# Patient Record
Sex: FEMALE | Race: WHITE | Employment: FULL TIME | URBAN - METROPOLITAN AREA
[De-identification: names, ages, dates, MRNs, and addresses within clinical notes are randomized per-mention and may not be internally consistent; named-entity substitution may affect disease eponyms.]

---

## 2019-12-16 ENCOUNTER — HOSPITAL ENCOUNTER (EMERGENCY)
Age: 24
Discharge: HOME | End: 2019-12-16
Attending: EMERGENCY MEDICINE
Payer: COMMERCIAL

## 2019-12-16 VITALS
SYSTOLIC BLOOD PRESSURE: 126 MMHG | HEART RATE: 94 BPM | BODY MASS INDEX: 19.83 KG/M2 | WEIGHT: 105 LBS | RESPIRATION RATE: 16 BRPM | HEIGHT: 61 IN | OXYGEN SATURATION: 100 % | DIASTOLIC BLOOD PRESSURE: 80 MMHG | TEMPERATURE: 98 F

## 2019-12-16 DIAGNOSIS — R10.13 ABDOMINAL PAIN, EPIGASTRIC: Primary | ICD-10-CM

## 2019-12-16 LAB
A/G RATIO: 1.7 (ref 1.1–2.2)
ALBUMIN SERPL-MCNC: 4.9 G/DL (ref 3.4–5)
ALP BLD-CCNC: 55 U/L (ref 40–129)
ALT SERPL-CCNC: 12 U/L (ref 10–40)
ANION GAP SERPL CALCULATED.3IONS-SCNC: 14 MMOL/L (ref 3–16)
AST SERPL-CCNC: 16 U/L (ref 15–37)
BASOPHILS ABSOLUTE: 0 K/UL (ref 0–0.2)
BASOPHILS RELATIVE PERCENT: 0.4 %
BILIRUB SERPL-MCNC: 0.3 MG/DL (ref 0–1)
BUN BLDV-MCNC: 11 MG/DL (ref 7–20)
CALCIUM SERPL-MCNC: 9.9 MG/DL (ref 8.3–10.6)
CHLORIDE BLD-SCNC: 104 MMOL/L (ref 99–110)
CO2: 21 MMOL/L (ref 21–32)
CREAT SERPL-MCNC: 0.7 MG/DL (ref 0.6–1.1)
EKG ATRIAL RATE: 104 BPM
EKG DIAGNOSIS: NORMAL
EKG P AXIS: 75 DEGREES
EKG P-R INTERVAL: 120 MS
EKG Q-T INTERVAL: 338 MS
EKG QRS DURATION: 80 MS
EKG QTC CALCULATION (BAZETT): 444 MS
EKG R AXIS: 50 DEGREES
EKG T AXIS: 1 DEGREES
EKG VENTRICULAR RATE: 104 BPM
EOSINOPHILS ABSOLUTE: 0 K/UL (ref 0–0.6)
EOSINOPHILS RELATIVE PERCENT: 0 %
GFR AFRICAN AMERICAN: >60
GFR NON-AFRICAN AMERICAN: >60
GLOBULIN: 2.9 G/DL
GLUCOSE BLD-MCNC: 97 MG/DL (ref 70–99)
HCG QUALITATIVE: NEGATIVE
HCT VFR BLD CALC: 44.3 % (ref 36–48)
HEMOGLOBIN: 14.7 G/DL (ref 12–16)
LYMPHOCYTES ABSOLUTE: 1 K/UL (ref 1–5.1)
LYMPHOCYTES RELATIVE PERCENT: 12.9 %
MCH RBC QN AUTO: 29.5 PG (ref 26–34)
MCHC RBC AUTO-ENTMCNC: 33.2 G/DL (ref 31–36)
MCV RBC AUTO: 88.9 FL (ref 80–100)
MONOCYTES ABSOLUTE: 0.3 K/UL (ref 0–1.3)
MONOCYTES RELATIVE PERCENT: 3.5 %
NEUTROPHILS ABSOLUTE: 6.6 K/UL (ref 1.7–7.7)
NEUTROPHILS RELATIVE PERCENT: 83.2 %
PDW BLD-RTO: 12.6 % (ref 12.4–15.4)
PLATELET # BLD: 279 K/UL (ref 135–450)
PMV BLD AUTO: 7.6 FL (ref 5–10.5)
POTASSIUM REFLEX MAGNESIUM: 3.7 MMOL/L (ref 3.5–5.1)
RBC # BLD: 4.98 M/UL (ref 4–5.2)
SODIUM BLD-SCNC: 139 MMOL/L (ref 136–145)
TOTAL PROTEIN: 7.8 G/DL (ref 6.4–8.2)
WBC # BLD: 8 K/UL (ref 4–11)

## 2019-12-16 PROCEDURE — 6370000000 HC RX 637 (ALT 250 FOR IP): Performed by: EMERGENCY MEDICINE

## 2019-12-16 PROCEDURE — 84703 CHORIONIC GONADOTROPIN ASSAY: CPT

## 2019-12-16 PROCEDURE — 99284 EMERGENCY DEPT VISIT MOD MDM: CPT

## 2019-12-16 PROCEDURE — 80053 COMPREHEN METABOLIC PANEL: CPT

## 2019-12-16 PROCEDURE — 93010 ELECTROCARDIOGRAM REPORT: CPT | Performed by: INTERNAL MEDICINE

## 2019-12-16 PROCEDURE — 85025 COMPLETE CBC W/AUTO DIFF WBC: CPT

## 2019-12-16 PROCEDURE — 93005 ELECTROCARDIOGRAM TRACING: CPT | Performed by: EMERGENCY MEDICINE

## 2019-12-16 RX ORDER — PANTOPRAZOLE SODIUM 40 MG/1
40 TABLET, DELAYED RELEASE ORAL ONCE
Status: COMPLETED | OUTPATIENT
Start: 2019-12-16 | End: 2019-12-16

## 2019-12-16 RX ORDER — OMEPRAZOLE 20 MG/1
20 CAPSULE, DELAYED RELEASE ORAL
Qty: 90 CAPSULE | Refills: 0 | Status: SHIPPED | OUTPATIENT
Start: 2019-12-16

## 2019-12-16 RX ORDER — ONDANSETRON 4 MG/1
4 TABLET, ORALLY DISINTEGRATING ORAL ONCE
Status: COMPLETED | OUTPATIENT
Start: 2019-12-16 | End: 2019-12-16

## 2019-12-16 RX ADMIN — PANTOPRAZOLE SODIUM 40 MG: 40 TABLET, DELAYED RELEASE ORAL at 10:28

## 2019-12-16 RX ADMIN — ONDANSETRON 4 MG: 4 TABLET, ORALLY DISINTEGRATING ORAL at 09:38

## 2019-12-16 RX ADMIN — LIDOCAINE HYDROCHLORIDE: 20 SOLUTION ORAL; TOPICAL at 09:37

## 2019-12-16 SDOH — HEALTH STABILITY: MENTAL HEALTH: HOW OFTEN DO YOU HAVE A DRINK CONTAINING ALCOHOL?: NEVER

## 2019-12-16 ASSESSMENT — PAIN DESCRIPTION - ONSET: ONSET: AWAKENED FROM SLEEP

## 2019-12-16 ASSESSMENT — PAIN DESCRIPTION - DESCRIPTORS: DESCRIPTORS: ACHING

## 2019-12-16 ASSESSMENT — PAIN DESCRIPTION - PAIN TYPE: TYPE: ACUTE PAIN

## 2019-12-16 ASSESSMENT — PAIN DESCRIPTION - FREQUENCY: FREQUENCY: CONTINUOUS

## 2019-12-16 ASSESSMENT — PAIN SCALES - GENERAL: PAINLEVEL_OUTOF10: 8

## 2019-12-16 ASSESSMENT — PAIN DESCRIPTION - LOCATION: LOCATION: ABDOMEN

## 2019-12-20 ENCOUNTER — HOSPITAL ENCOUNTER (OUTPATIENT)
Dept: ULTRASOUND IMAGING | Age: 24
Discharge: HOME | End: 2019-12-20
Payer: COMMERCIAL

## 2019-12-20 DIAGNOSIS — R10.84 ABDOMINAL PAIN, GENERALIZED: ICD-10-CM

## 2019-12-20 PROCEDURE — 76700 US EXAM ABDOM COMPLETE: CPT

## 2021-06-11 ENCOUNTER — APPOINTMENT (OUTPATIENT)
Dept: ULTRASOUND IMAGING | Age: 26
End: 2021-06-11
Payer: COMMERCIAL

## 2021-06-11 ENCOUNTER — HOSPITAL ENCOUNTER (EMERGENCY)
Age: 26
Discharge: HOME | End: 2021-06-11
Payer: COMMERCIAL

## 2021-06-11 VITALS
OXYGEN SATURATION: 98 % | SYSTOLIC BLOOD PRESSURE: 132 MMHG | HEART RATE: 72 BPM | BODY MASS INDEX: 19.83 KG/M2 | HEIGHT: 61 IN | DIASTOLIC BLOOD PRESSURE: 88 MMHG | RESPIRATION RATE: 15 BRPM | TEMPERATURE: 98.7 F | WEIGHT: 105 LBS

## 2021-06-11 DIAGNOSIS — O20.0 THREATENED MISCARRIAGE: ICD-10-CM

## 2021-06-11 DIAGNOSIS — O46.90 VAGINAL BLEEDING IN PREGNANCY: Primary | ICD-10-CM

## 2021-06-11 LAB
A/G RATIO: 1.8 (ref 1.1–2.2)
ABO/RH: NORMAL
ALBUMIN SERPL-MCNC: 5 G/DL (ref 3.4–5)
ALP BLD-CCNC: 53 U/L (ref 40–129)
ALT SERPL-CCNC: 9 U/L (ref 10–40)
ANION GAP SERPL CALCULATED.3IONS-SCNC: 12 MMOL/L (ref 3–16)
AST SERPL-CCNC: 14 U/L (ref 15–37)
BACTERIA: ABNORMAL /HPF
BASOPHILS ABSOLUTE: 0 K/UL (ref 0–0.2)
BASOPHILS RELATIVE PERCENT: 0.4 %
BILIRUB SERPL-MCNC: 0.4 MG/DL (ref 0–1)
BILIRUBIN URINE: NEGATIVE
BLOOD, URINE: ABNORMAL
BUN BLDV-MCNC: 10 MG/DL (ref 7–20)
CALCIUM SERPL-MCNC: 9.2 MG/DL (ref 8.3–10.6)
CHLORIDE BLD-SCNC: 102 MMOL/L (ref 99–110)
CLARITY: ABNORMAL
CO2: 24 MMOL/L (ref 21–32)
COLOR: ABNORMAL
CREAT SERPL-MCNC: 0.6 MG/DL (ref 0.6–1.1)
EOSINOPHILS ABSOLUTE: 0 K/UL (ref 0–0.6)
EOSINOPHILS RELATIVE PERCENT: 0.5 %
EPITHELIAL CELLS, UA: ABNORMAL /HPF (ref 0–5)
GFR AFRICAN AMERICAN: >60
GFR NON-AFRICAN AMERICAN: >60
GLOBULIN: 2.8 G/DL
GLUCOSE BLD-MCNC: 100 MG/DL (ref 70–99)
GLUCOSE URINE: NEGATIVE MG/DL
GONADOTROPIN, CHORIONIC (HCG) QUANT: 3361 MIU/ML
HCT VFR BLD CALC: 43.1 % (ref 36–48)
HEMOGLOBIN: 14.4 G/DL (ref 12–16)
KETONES, URINE: NEGATIVE MG/DL
LEUKOCYTE ESTERASE, URINE: NEGATIVE
LYMPHOCYTES ABSOLUTE: 1.4 K/UL (ref 1–5.1)
LYMPHOCYTES RELATIVE PERCENT: 23 %
MAGNESIUM: 2.3 MG/DL (ref 1.8–2.4)
MCH RBC QN AUTO: 30.6 PG (ref 26–34)
MCHC RBC AUTO-ENTMCNC: 33.5 G/DL (ref 31–36)
MCV RBC AUTO: 91.6 FL (ref 80–100)
MICROSCOPIC EXAMINATION: YES
MONOCYTES ABSOLUTE: 0.3 K/UL (ref 0–1.3)
MONOCYTES RELATIVE PERCENT: 5.3 %
NEUTROPHILS ABSOLUTE: 4.4 K/UL (ref 1.7–7.7)
NEUTROPHILS RELATIVE PERCENT: 70.8 %
NITRITE, URINE: NEGATIVE
PDW BLD-RTO: 12.8 % (ref 12.4–15.4)
PH UA: 7.5 (ref 5–8)
PLATELET # BLD: 248 K/UL (ref 135–450)
PMV BLD AUTO: 7.9 FL (ref 5–10.5)
POTASSIUM REFLEX MAGNESIUM: 3.4 MMOL/L (ref 3.5–5.1)
PROTEIN UA: ABNORMAL MG/DL
RBC # BLD: 4.71 M/UL (ref 4–5.2)
RBC UA: >100 /HPF (ref 0–4)
SODIUM BLD-SCNC: 138 MMOL/L (ref 136–145)
SPECIFIC GRAVITY UA: 1.01 (ref 1–1.03)
SPECIMEN STATUS: NORMAL
TOTAL PROTEIN: 7.8 G/DL (ref 6.4–8.2)
URINE TYPE: ABNORMAL
UROBILINOGEN, URINE: 0.2 E.U./DL
WBC # BLD: 6.2 K/UL (ref 4–11)
WBC UA: ABNORMAL /HPF (ref 0–5)

## 2021-06-11 PROCEDURE — 86900 BLOOD TYPING SEROLOGIC ABO: CPT

## 2021-06-11 PROCEDURE — 86901 BLOOD TYPING SEROLOGIC RH(D): CPT

## 2021-06-11 PROCEDURE — 80053 COMPREHEN METABOLIC PANEL: CPT

## 2021-06-11 PROCEDURE — 81001 URINALYSIS AUTO W/SCOPE: CPT

## 2021-06-11 PROCEDURE — 83735 ASSAY OF MAGNESIUM: CPT

## 2021-06-11 PROCEDURE — 85025 COMPLETE CBC W/AUTO DIFF WBC: CPT

## 2021-06-11 PROCEDURE — 84702 CHORIONIC GONADOTROPIN TEST: CPT

## 2021-06-11 PROCEDURE — 76817 TRANSVAGINAL US OBSTETRIC: CPT

## 2021-06-11 PROCEDURE — 99283 EMERGENCY DEPT VISIT LOW MDM: CPT

## 2021-06-11 ASSESSMENT — PAIN DESCRIPTION - ORIENTATION: ORIENTATION: LOWER

## 2021-06-11 ASSESSMENT — ENCOUNTER SYMPTOMS
NAUSEA: 0
VOMITING: 0
DIARRHEA: 0
BACK PAIN: 0
ABDOMINAL PAIN: 1
WHEEZING: 0
COUGH: 0
SHORTNESS OF BREATH: 0
COLOR CHANGE: 0

## 2021-06-11 ASSESSMENT — PAIN DESCRIPTION - DESCRIPTORS: DESCRIPTORS: CRAMPING

## 2021-06-11 ASSESSMENT — PAIN DESCRIPTION - PAIN TYPE: TYPE: ACUTE PAIN

## 2021-06-11 ASSESSMENT — PAIN DESCRIPTION - FREQUENCY: FREQUENCY: CONTINUOUS

## 2021-06-11 ASSESSMENT — PAIN DESCRIPTION - LOCATION: LOCATION: ABDOMEN

## 2021-06-11 ASSESSMENT — PAIN SCALES - GENERAL: PAINLEVEL_OUTOF10: 3

## 2021-06-11 NOTE — ED NOTES
Saline lock removed intact. IV site looked unremarkable. Pressure dressing applied. Discharge instructions reviewed with Ms. Randy Larson. She verbalized understanding. Copy of discharge instructions given. Ms. Randy Larson was discharged to home in good condition per personal vehicle, family driving. She exited the ED without difficulty.         Rocio Arboleda RN  06/11/21 5218

## 2021-06-11 NOTE — LETTER
[facility]  [address] [address]  Phone: [phone]  Fax: [phone]               June 11, 2021    Patient: Xochilt Hameed   YOB: 1995   Date of Visit: 6/11/2021       To Whom It May Concern:    Makeda Sharpe was seen and treated in our emergency department on 6/11/2021. She may return to work on 6/16/2021.       Sincerely,           Signature:__________________________________

## 2021-06-11 NOTE — ED NOTES
200 - called [facility] per consult  Re: OB d/t miscarriage  [phone] - [facility] called stating that we can call Dr Sea Walsh directly as he is on-call for today  [id number] - BRENDA Alba called Dr Sea Walsh at this time to speak regarding pt     Calos Anselmo  06/11/21 1668

## 2021-06-11 NOTE — ED PROVIDER NOTES
**ADVANCED PRACTICE PROVIDER, I HAVE EVALUATED THIS [facility]  ED  EMERGENCY DEPARTMENT ENCOUNTER      Pt Name: April D Emily Ma  RTE:[id number]  [place] 1995  Date of evaluation: 6/11/2021  Provider: LOY Hayes CNP      Chief Complaint:    Chief Complaint   Patient presents with    Vaginal Bleeding     patient is 7 weeks and 5 days pregnant. Patient started having some bleeding and abdominal cramping last night at midnight, cramping is intermittant and bleeding since 0600. 1 pad per 1.5hrs. Patient c/o nausea         Nursing Notes, Past Medical Hx, Past Surgical Hx, Social Hx, Allergies, and Family Hx were all reviewed and agreed with or any disagreements were addressed in the HPI.    HPI: (Location, Duration, Timing, Severity, Quality, Assoc Sx, Context, Modifying factors)  This is a  23 y.o. female who presents with vaginal bleeding in pregnancy, she denies saturating a pad, however, she states that she has been spotting off and on for the past week and a half, however, last night started having heavier bright red bleeding but not passing blood clots. She states that she changes her pad every 1-2 hours, but there is not a lot of heavy bleeding. She sees [facility] and was told to come into the ED. he rates her lower abdominal cramping a 3 out of 10. Denies any vomiting or diarrhea but has felt nauseous. No urinary symptoms. Recently was seen at [facility] however has not had an ultrasound can confirm pregnancy. She is G-1, a-0, P-0. She states she is approximately 7-1/2 weeks pregnant. She denies any additional complaints, no additional aggravating relieving factors. Patient presents awake, alert and in no acute distress or toxic appearance.     PastMedical/Surgical History:      Diagnosis Date    Seasonal allergies          Procedure Laterality Date    WISDOM TOOTH EXTRACTION         Medications:  Previous Medications    ESTROPIPATE (OGEN) 1.5 MG TABLET    Take 1.5 mg by mouth daily    OMEPRAZOLE (PRILOSEC) 20 MG DELAYED RELEASE CAPSULE    Take 1 capsule by mouth every morning (before breakfast)         Review of Systems:  (2-9 systems needed)  Review of Systems   Constitutional: Negative for chills and fever. HENT: Negative for congestion. Respiratory: Negative for cough, shortness of breath and wheezing. Cardiovascular: Negative for chest pain. Gastrointestinal: Positive for abdominal pain. Negative for diarrhea, nausea and vomiting. She does report having suprapubic abdominal cramping associate with her pregnancy, has had some nausea but no vomiting or diarrhea no urinary symptoms   Genitourinary: Positive for pelvic pain and vaginal bleeding. Negative for difficulty urinating, dysuria, frequency, hematuria and urgency. Patient complains of suprapubic abdominal cramping and vaginal bleeding, states that she started with this yesterday evening, is not saturating pad, denies any excessive blood clots. States she is only changing her pad every 1-2 hours but they are not even saturated. She states she is G-1, P-0, a-0 with unsure of exact last menstrual cycle however she believes she is 7 weeks and 5 days pregnant, follows [facility]. Musculoskeletal: Negative for back pain. Skin: Negative for color change. Neurological: Negative for weakness, numbness and headaches. Physical Exam:  Physical Exam  Vitals and nursing note reviewed. Constitutional:       Appearance: She is well-developed. She is not diaphoretic. HENT:      Head: Normocephalic. Right Ear: External ear normal.      Left Ear: External ear normal.   Eyes:      General: No scleral icterus. Right eye: No discharge. Left eye: No discharge. Cardiovascular:      Rate and Rhythm: Normal rate. Pulmonary:      Effort: Pulmonary effort is normal. No respiratory distress. Breath sounds: Normal breath sounds.    Abdominal: Palpations: Abdomen is soft. Tenderness: There is abdominal tenderness. Comments: Abdomen is flat soft and nondistended. Bowel sounds are hypoactive, no significant tenderness guarding or rebound tenderness. No ascites or rigidity. She is having cramping in the suprapubic region. Musculoskeletal:         General: Normal range of motion. Cervical back: Normal range of motion and neck supple. Skin:     General: Skin is warm. Capillary Refill: Capillary refill takes less than 2 seconds. Coloration: Skin is not pale. Neurological:      General: No focal deficit present. Mental Status: She is alert and oriented to person, place, and time. GCS: GCS eye subscore is 4. GCS verbal subscore is 5. GCS motor subscore is 6.    Psychiatric:         Behavior: Behavior normal.         MEDICAL DECISION MAKING    Vitals:    Vitals:    06/11/21 1104 06/11/21 1304 06/11/21 1402 06/11/21 1517   BP: 126/82 130/75 116/70 132/88   Pulse: 81 70  72   Resp: 16 16  15   Temp: 98.7 °F (37.1 °C)      TempSrc: Oral      SpO2: 99% 100% 100% 98%   Weight: 105 lb (47.6 kg)      Height: 5' 1\" (1.549 m)          LABS:  Labs Reviewed   COMPREHENSIVE METABOLIC PANEL W/ REFLEX TO MG FOR LOW K - Abnormal; Notable for the following components:       Result Value    Potassium reflex Magnesium 3.4 (*)     Glucose 100 (*)     ALT 9 (*)     AST 14 (*)     All other components within normal limits    Narrative:     Performed at:  [place] [address] [address]   Phone [phone]   URINALYSIS - Abnormal; Notable for the following components:    Color, UA GISELE (*)     Clarity, UA SL CLOUDY (*)     Blood, Urine LARGE (*)     Protein, UA TRACE (*)     All other components within normal limits    Narrative:     Performed at:  [place] [address] [address]   Phone [phone]   MICROSCOPIC URINALYSIS - of the patient's condition and risk of further deterioration, critical care time required my full attention to the patient's condition; which included chart data review, documentation, medication ordering, reviewing the patient's old records, reevaluation patient's cardiac, pulmonary and neurological status. Reevaluation of vital signs. Consultations with ED attending and admitting physician. Ordering, interpreting reviewing diagnostic testing. Therefore a critical care time was 18 minutes of direct attention to the patient's condition did not include time spent on procedures. PROCEDURES:   Procedures    Pelvic exam: VULVA: normal appearing vulva with no masses, tenderness or lesions, VAGINA: normal appearing vagina with normal color and discharge, no lesions, CERVIX: Cervical os closed, normal appearing cervix without discharge or lesions, cervical motion tenderness absent, UTERUS: uterus is normal size, shape, consistency and nontender, exam chaperoned by staff ED Tech. Patient was given:  Medications - No data to display  Patient complains of suprapubic abdominal cramping and vaginal bleeding, states that she started with this yesterday evening, is not saturating pad, denies any excessive blood clots. States she is only changing her pad every 1-2 hours but they are not even saturated. She states she is G-1, P-0, a-0 with unsure of exact last menstrual cycle however she believes she is 7 weeks and 5 days pregnant, follows [facility]. After evaluation and examination the patient IV access, blood work, urinalysis, transvaginal ultrasound and pelvic exam was done by myself. CBC shows no sepsis or anemia. Metabolic panel shows no electrolyte disturbances or renal insufficiency. Liver functions are normal.  Beta count is 3361. She is Rh+. Urine shows blood most likely vaginal, I did do a pelvic exam, see procedure note, the os appears to be closed.   Transvaginal ultrasound shows no evidence of IUP, small amount of free fluid noted in the pelvis, ovaries grossly unremarkable. However, I do believe the patient is a threatened miscarriage or early IUP. I paged OB/GYN on-call. At 1540 I spoke with Dr. Ron Kinsey about on-call, we discussed the patient's case at length, he agrees with my plan that the patient should be discharged home with specific instructions to return immediately for emergent condition however more importantly that the patient have a repeat beta done on Sunday by coming to outpatient lab. Therefore, at this time no concerns for ectopic pregnancy that requires emergent surgical intervention, sepsis or acute surgical abdomen. It was agreed the patient will be discharged home with outpatient follow-up, shared medical decision was made between the patient, OB/GYN on-call and myself only agreed patient be discharged home with outpatient follow-up. Patient was educated to come back on Sunday to have a repeat beta count, keep her appointment is already scheduled on Tuesday with [facility] group. Return immediately to the ER for any worsening or concerning symptoms. Take any Tylenol for discomfort. The patient tolerated their visit well. I evaluated the patient. The physician was available for consultation as needed. The patient and / or the family were informed of the results of any tests, a time was given to answer questions, a plan was proposed and they agreed with plan. Patient verbalized understanding of discharge instructions and was discharged from the department in stable condition. CLINICAL IMPRESSION:  1. Vaginal bleeding in pregnancy    2.  Threatened miscarriage        DISPOSITION Decision To Discharge 06/11/2021 03:52:13 PM      PATIENT REFERRED TO:  Shelia Roa MD  [address] [address]  [phone]    Schedule an appointment as soon as possible for a visit   As needed    Ross Daniels MD  [phone] [id number] [address] [facility] [address]  [facility]

## 2021-06-13 ENCOUNTER — HOSPITAL ENCOUNTER (OUTPATIENT)
Age: 26
Setting detail: SPECIMEN
Discharge: HOME | End: 2021-06-13
Payer: COMMERCIAL

## 2021-06-13 DIAGNOSIS — O20.0 THREATENED MISCARRIAGE: ICD-10-CM

## 2021-06-13 DIAGNOSIS — O46.90 VAGINAL BLEEDING IN PREGNANCY: ICD-10-CM

## 2021-06-13 PROCEDURE — 84702 CHORIONIC GONADOTROPIN TEST: CPT

## 2021-06-13 PROCEDURE — 36415 COLL VENOUS BLD VENIPUNCTURE: CPT

## 2021-06-14 LAB — GONADOTROPIN, CHORIONIC (HCG) QUANT: 4317 MIU/ML

## 2021-06-15 ENCOUNTER — HOSPITAL ENCOUNTER (OUTPATIENT)
Dept: NURSING | Age: 26
Setting detail: INFUSION SERIES
Discharge: HOME | End: 2021-06-15

## 2021-06-15 VITALS
WEIGHT: 106 LBS | HEART RATE: 83 BPM | HEIGHT: 61 IN | BODY MASS INDEX: 20.01 KG/M2 | TEMPERATURE: 97.3 F | RESPIRATION RATE: 16 BRPM | SYSTOLIC BLOOD PRESSURE: 119 MMHG | DIASTOLIC BLOOD PRESSURE: 80 MMHG

## 2021-06-15 LAB
A/G RATIO: 1.8 (ref 1.1–2.2)
ABO/RH: NORMAL
ALBUMIN SERPL-MCNC: 5.1 G/DL (ref 3.4–5)
ALP BLD-CCNC: 57 U/L (ref 40–129)
ALT SERPL-CCNC: 15 U/L (ref 10–40)
ANION GAP SERPL CALCULATED.3IONS-SCNC: 9 MMOL/L (ref 3–16)
ANTIBODY SCREEN: NORMAL
AST SERPL-CCNC: 18 U/L (ref 15–37)
BILIRUB SERPL-MCNC: 0.3 MG/DL (ref 0–1)
BUN BLDV-MCNC: 13 MG/DL (ref 7–20)
CALCIUM SERPL-MCNC: 9.3 MG/DL (ref 8.3–10.6)
CHLORIDE BLD-SCNC: 104 MMOL/L (ref 99–110)
CO2: 25 MMOL/L (ref 21–32)
CREAT SERPL-MCNC: 0.7 MG/DL (ref 0.6–1.1)
GFR AFRICAN AMERICAN: >60
GFR NON-AFRICAN AMERICAN: >60
GLOBULIN: 2.8 G/DL
GLUCOSE BLD-MCNC: 94 MG/DL (ref 70–99)
GONADOTROPIN, CHORIONIC (HCG) QUANT: 6153 MIU/ML
HCT VFR BLD CALC: 41.6 % (ref 36–48)
HEMOGLOBIN: 14.1 G/DL (ref 12–16)
MCH RBC QN AUTO: 30.8 PG (ref 26–34)
MCHC RBC AUTO-ENTMCNC: 33.9 G/DL (ref 31–36)
MCV RBC AUTO: 90.8 FL (ref 80–100)
PDW BLD-RTO: 12.8 % (ref 12.4–15.4)
PLATELET # BLD: 215 K/UL (ref 135–450)
PMV BLD AUTO: 7.9 FL (ref 5–10.5)
POTASSIUM SERPL-SCNC: 4.2 MMOL/L (ref 3.5–5.1)
RBC # BLD: 4.58 M/UL (ref 4–5.2)
SODIUM BLD-SCNC: 138 MMOL/L (ref 136–145)
TOTAL PROTEIN: 7.9 G/DL (ref 6.4–8.2)
WBC # BLD: 5.7 K/UL (ref 4–11)

## 2021-06-15 PROCEDURE — 80053 COMPREHEN METABOLIC PANEL: CPT

## 2021-06-15 PROCEDURE — 96401 CHEMO ANTI-NEOPL SQ/IM: CPT

## 2021-06-15 PROCEDURE — 99211 OFF/OP EST MAY X REQ PHY/QHP: CPT

## 2021-06-15 PROCEDURE — 86900 BLOOD TYPING SEROLOGIC ABO: CPT

## 2021-06-15 PROCEDURE — 6360000002 HC RX W HCPCS: Performed by: OBSTETRICS & GYNECOLOGY

## 2021-06-15 PROCEDURE — 86850 RBC ANTIBODY SCREEN: CPT

## 2021-06-15 PROCEDURE — 86901 BLOOD TYPING SEROLOGIC RH(D): CPT

## 2021-06-15 PROCEDURE — 85027 COMPLETE CBC AUTOMATED: CPT

## 2021-06-15 PROCEDURE — 84702 CHORIONIC GONADOTROPIN TEST: CPT

## 2021-06-15 RX ORDER — METHOTREXATE 25 MG/ML
50 INJECTION, SOLUTION INTRA-ARTERIAL; INTRAMUSCULAR; INTRAVENOUS ONCE
Status: COMPLETED | OUTPATIENT
Start: 2021-06-15 | End: 2021-06-15

## 2021-06-15 RX ADMIN — METHOTREXATE 71.5 MG: 25 SOLUTION INTRA-ARTERIAL; INTRAMUSCULAR; INTRATHECAL; INTRAVENOUS at 13:07

## 2021-06-15 ASSESSMENT — PAIN DESCRIPTION - DESCRIPTORS: DESCRIPTORS: CRAMPING

## 2021-06-15 ASSESSMENT — PAIN - FUNCTIONAL ASSESSMENT: PAIN_FUNCTIONAL_ASSESSMENT: 0-10

## 2021-06-15 NOTE — H&P
Department of Obstetrics and Gynecology  Attending Obstetrics History and Physical        CHIEF COMPLAINT:  For Methotrexate administration 2/2 high suspicion for ectopic pregnacy    HISTORY OF PRESENT ILLNESS:      25yo G2 P 0-0-1-0 at 8w 4d by LMP [facility] 21 here for MTX administration after pregnancy of unknown etiology and followed with serial bhcg levels after initial amenorrhea visit in office at [facility] and subsequent ED visit on 21 c/o vaginal bleeding and pelvic cramping, at that time, US result showed no IUP, unremarkable ovaries, small free fluid in CDS, no evidence/suspicion for ectopic pregnancy. Clinical sx have since resolved. However she presented to the office today for repeat pelvic US and found to have no IUP, left adnexal mass measuring 1.8cm, with doppler flow, no cardiac activity, no free fluid. Serial bhc21:  2447  21:  3361  21:  4317  6/15/21:  6153    REVIEW OF SYSTEMS    See HPI for further details. Review of systems otherwise negative. PAST MEDICAL HISTORY    Past Medical History:   Diagnosis Date    Seasonal allergies        FAMILY HISTORY    History reviewed. No pertinent family history.     SOCIAL HISTORY    Social History     Socioeconomic History    Marital status: Single     Spouse name: None    Number of children: None    Years of education: None    Highest education level: None   Occupational History    None   Tobacco Use    Smoking status: Never Smoker    Smokeless tobacco: Never Used   Substance and Sexual Activity    Alcohol use: Not Currently     Comment: occ    Drug use: No    Sexual activity: None   Other Topics Concern    None   Social History Narrative    None     Social Determinants of Health     Financial Resource Strain:     Difficulty of Paying Living Expenses:    Food Insecurity:     Worried About Running Out of Food in the Last Year:     Ran Out of Food in the Last Year:    Transportation Needs:     Lack of Transportation (Medical):  Lack of Transportation (Non-Medical):    Physical Activity:     Days of Exercise per Week:     Minutes of Exercise per Session:    Stress:     Feeling of Stress :    Social Connections:     Frequency of Communication with Friends and Family:     Frequency of Social Gatherings with Friends and Family:     Attends Rastafarian Services:     Active Member of Clubs or Organizations:     Attends Club or Organization Meetings:     Marital Status:    Intimate Partner Violence:     Fear of Current or Ex-Partner:     Emotionally Abused:     Physically Abused:     Sexually Abused:        SURGICAL HISTORY    Past Surgical History:   Procedure Laterality Date    WISDOM TOOTH EXTRACTION         CURRENT MEDICATIONS    Current Outpatient Rx   Medication Sig Dispense Refill    omeprazole (PRILOSEC) 20 MG delayed release capsule Take 1 capsule by mouth every morning (before breakfast) 90 capsule 0       ALLERGIES    No Known Allergies    IMMUNIZATIONS      There is no immunization history on file for this patient. PHYSICAL EXAM    VITAL SIGNS: /80   Pulse 83   Temp 97.3 °F (36.3 °C) (Temporal)   Resp 16   Ht 5' 1\" (1.549 m)   Wt 106 lb (48.1 kg)   BMI 20.03 kg/m²    Constitutional: Well developed, Well nourished, No acute distress, Non-toxic appearance, appears stated age  Cardiovascular: + S1,S2  Thorax & Lungs: Normal work of breathing, No respiratory distress, No wheezing. ABDOMEN:  Soft non tender  Cervix: deferred as asymptomatic & no bleeding at visit  Skin: Warm, Dry. Neurologic: Alert & oriented, Normal motor function, Normal sensory function, No focal deficits noted. ED COURSE & MEDICAL DECISION MAKING    Pertinent Labs & Imaging studies reviewed.  (See chart for details)    ASSESSMENT AND PLAN:  The patient is a 23 y.o.  2 parity 0010 at 8.4 weeks    Principal Problem:  -suspected ectopic pregnancy    Plan:   1. clinically and vitally stable, asymptomatic. Rh positive. 2. Pt with inappropriate rise in bhcg, currently 6153 yet no IUP w suspected adnexal L sided mass 1.8cm (no cardiac activity/+ doppler flow). 3. Medical (Methotrexate) vs surgical options reviewed. 4. Labs reviewed, pt good candidate for MTX. Importance of compliance reviewed. She understands importance of repeat bhcg levels on Day 4 and day 7 in the office or in ED if unable to make it during business hours, reviewed may see initial increase in levels but anticipate at least 15% decrease between day 4 to day 7 with good response from the medication. MC side effects reviewed, appropriate diet and rec to avoid folate containing products food, otc supplements, vitamins etc as to not counteract therapy. 5. Methotrexate 71.5mg administered L glute 2.5cc dosed 50mg/m2 per BSA. 6. Advised to avoid NSAIDS during treatment, adv to take tylenol prn pain. 7. Emergency precautions reviewed. 8. Pt tolerated well, discharged home in stable condition.

## 2021-06-15 NOTE — PROGRESS NOTES
Dr Finn Duty speaks with patient and significant other. All questions answered and both verbalize understanding. Methotrexate administered per physician in right gluteal muscle. Patient tolerates well. Instructed to followup with office on Friday for repeat HCG level.  Stays in facility 15 minutes post injection with no s/s of reaction vital signs stable discharged to home in stable condition

## 2021-06-22 ENCOUNTER — ANESTHESIA (OUTPATIENT)
Dept: OPERATING ROOM | Age: 26
End: 2021-06-22
Payer: COMMERCIAL

## 2021-06-22 ENCOUNTER — ANESTHESIA EVENT (OUTPATIENT)
Dept: OPERATING ROOM | Age: 26
End: 2021-06-22
Payer: COMMERCIAL

## 2021-06-22 ENCOUNTER — HOSPITAL ENCOUNTER (EMERGENCY)
Age: 26
Discharge: HOME | End: 2021-06-23
Attending: EMERGENCY MEDICINE
Payer: COMMERCIAL

## 2021-06-22 VITALS
WEIGHT: 105 LBS | TEMPERATURE: 96.8 F | HEART RATE: 96 BPM | HEIGHT: 61 IN | DIASTOLIC BLOOD PRESSURE: 77 MMHG | BODY MASS INDEX: 19.83 KG/M2 | SYSTOLIC BLOOD PRESSURE: 121 MMHG | OXYGEN SATURATION: 97 % | RESPIRATION RATE: 20 BRPM

## 2021-06-22 VITALS
RESPIRATION RATE: 3 BRPM | SYSTOLIC BLOOD PRESSURE: 148 MMHG | DIASTOLIC BLOOD PRESSURE: 102 MMHG | OXYGEN SATURATION: 99 %

## 2021-06-22 DIAGNOSIS — O46.90 VAGINAL BLEEDING IN PREGNANCY: ICD-10-CM

## 2021-06-22 DIAGNOSIS — Z98.890 STATUS POST LAPAROSCOPY: ICD-10-CM

## 2021-06-22 DIAGNOSIS — O00.102 LEFT TUBAL PREGNANCY WITHOUT INTRAUTERINE PREGNANCY: Primary | ICD-10-CM

## 2021-06-22 DIAGNOSIS — R10.32 ABDOMINAL PAIN, LEFT LOWER QUADRANT: ICD-10-CM

## 2021-06-22 PROBLEM — O00.109 ECTOPIC PREGNANCY, TUBAL: Status: ACTIVE | Noted: 2021-06-22

## 2021-06-22 PROBLEM — Z90.79 H/O UNILATERAL SALPINGECTOMY: Status: ACTIVE | Noted: 2021-06-22

## 2021-06-22 LAB
A/G RATIO: 2 (ref 1.1–2.2)
ABO/RH: NORMAL
ALBUMIN SERPL-MCNC: 5.1 G/DL (ref 3.4–5)
ALP BLD-CCNC: 54 U/L (ref 40–129)
ALT SERPL-CCNC: 10 U/L (ref 10–40)
ANION GAP SERPL CALCULATED.3IONS-SCNC: 12 MMOL/L (ref 3–16)
ANTIBODY SCREEN: NORMAL
AST SERPL-CCNC: 13 U/L (ref 15–37)
BASOPHILS ABSOLUTE: 0 K/UL (ref 0–0.2)
BASOPHILS RELATIVE PERCENT: 0.7 %
BILIRUB SERPL-MCNC: <0.2 MG/DL (ref 0–1)
BUN BLDV-MCNC: 17 MG/DL (ref 7–20)
CALCIUM SERPL-MCNC: 9.3 MG/DL (ref 8.3–10.6)
CHLORIDE BLD-SCNC: 101 MMOL/L (ref 99–110)
CO2: 24 MMOL/L (ref 21–32)
CREAT SERPL-MCNC: 0.7 MG/DL (ref 0.6–1.1)
EOSINOPHILS ABSOLUTE: 0.1 K/UL (ref 0–0.6)
EOSINOPHILS RELATIVE PERCENT: 1.6 %
GFR AFRICAN AMERICAN: >60
GFR NON-AFRICAN AMERICAN: >60
GLOBULIN: 2.6 G/DL
GLUCOSE BLD-MCNC: 99 MG/DL (ref 70–99)
GONADOTROPIN, CHORIONIC (HCG) QUANT: 5070 MIU/ML
HCT VFR BLD CALC: 41.9 % (ref 36–48)
HEMOGLOBIN: 14.2 G/DL (ref 12–16)
INR BLD: 1.1 (ref 0.86–1.14)
LYMPHOCYTES ABSOLUTE: 2 K/UL (ref 1–5.1)
LYMPHOCYTES RELATIVE PERCENT: 29.6 %
MCH RBC QN AUTO: 31 PG (ref 26–34)
MCHC RBC AUTO-ENTMCNC: 33.8 G/DL (ref 31–36)
MCV RBC AUTO: 91.5 FL (ref 80–100)
MONOCYTES ABSOLUTE: 0.4 K/UL (ref 0–1.3)
MONOCYTES RELATIVE PERCENT: 5.8 %
NEUTROPHILS ABSOLUTE: 4.2 K/UL (ref 1.7–7.7)
NEUTROPHILS RELATIVE PERCENT: 62.3 %
PDW BLD-RTO: 12.3 % (ref 12.4–15.4)
PLATELET # BLD: 220 K/UL (ref 135–450)
PMV BLD AUTO: 7.8 FL (ref 5–10.5)
POTASSIUM REFLEX MAGNESIUM: 3.7 MMOL/L (ref 3.5–5.1)
PROTHROMBIN TIME: 12.8 SEC (ref 10–13.2)
RBC # BLD: 4.58 M/UL (ref 4–5.2)
SODIUM BLD-SCNC: 137 MMOL/L (ref 136–145)
TOTAL PROTEIN: 7.7 G/DL (ref 6.4–8.2)
WBC # BLD: 6.7 K/UL (ref 4–11)

## 2021-06-22 PROCEDURE — 84702 CHORIONIC GONADOTROPIN TEST: CPT

## 2021-06-22 PROCEDURE — 86901 BLOOD TYPING SEROLOGIC RH(D): CPT

## 2021-06-22 PROCEDURE — 2580000003 HC RX 258: Performed by: ANESTHESIOLOGY

## 2021-06-22 PROCEDURE — 6360000002 HC RX W HCPCS

## 2021-06-22 PROCEDURE — 2720000010 HC SURG SUPPLY STERILE: Performed by: OBSTETRICS & GYNECOLOGY

## 2021-06-22 PROCEDURE — 7100000011 HC PHASE II RECOVERY - ADDTL 15 MIN: Performed by: OBSTETRICS & GYNECOLOGY

## 2021-06-22 PROCEDURE — 96374 THER/PROPH/DIAG INJ IV PUSH: CPT

## 2021-06-22 PROCEDURE — 85610 PROTHROMBIN TIME: CPT

## 2021-06-22 PROCEDURE — 7100000010 HC PHASE II RECOVERY - FIRST 15 MIN: Performed by: OBSTETRICS & GYNECOLOGY

## 2021-06-22 PROCEDURE — 86850 RBC ANTIBODY SCREEN: CPT

## 2021-06-22 PROCEDURE — 3700000000 HC ANESTHESIA ATTENDED CARE: Performed by: OBSTETRICS & GYNECOLOGY

## 2021-06-22 PROCEDURE — 6370000000 HC RX 637 (ALT 250 FOR IP): Performed by: ANESTHESIOLOGY

## 2021-06-22 PROCEDURE — 7100000001 HC PACU RECOVERY - ADDTL 15 MIN: Performed by: OBSTETRICS & GYNECOLOGY

## 2021-06-22 PROCEDURE — 6360000002 HC RX W HCPCS: Performed by: ANESTHESIOLOGY

## 2021-06-22 PROCEDURE — 2500000003 HC RX 250 WO HCPCS: Performed by: OBSTETRICS & GYNECOLOGY

## 2021-06-22 PROCEDURE — 85025 COMPLETE CBC W/AUTO DIFF WBC: CPT

## 2021-06-22 PROCEDURE — 3600000014 HC SURGERY LEVEL 4 ADDTL 15MIN: Performed by: OBSTETRICS & GYNECOLOGY

## 2021-06-22 PROCEDURE — 7100000000 HC PACU RECOVERY - FIRST 15 MIN: Performed by: OBSTETRICS & GYNECOLOGY

## 2021-06-22 PROCEDURE — 2709999900 HC NON-CHARGEABLE SUPPLY: Performed by: OBSTETRICS & GYNECOLOGY

## 2021-06-22 PROCEDURE — 88305 TISSUE EXAM BY PATHOLOGIST: CPT

## 2021-06-22 PROCEDURE — 6370000000 HC RX 637 (ALT 250 FOR IP): Performed by: OBSTETRICS & GYNECOLOGY

## 2021-06-22 PROCEDURE — 3600000004 HC SURGERY LEVEL 4 BASE: Performed by: OBSTETRICS & GYNECOLOGY

## 2021-06-22 PROCEDURE — 3700000001 HC ADD 15 MINUTES (ANESTHESIA): Performed by: OBSTETRICS & GYNECOLOGY

## 2021-06-22 PROCEDURE — 86900 BLOOD TYPING SEROLOGIC ABO: CPT

## 2021-06-22 PROCEDURE — 99283 EMERGENCY DEPT VISIT LOW MDM: CPT

## 2021-06-22 PROCEDURE — 80053 COMPREHEN METABOLIC PANEL: CPT

## 2021-06-22 PROCEDURE — 2500000003 HC RX 250 WO HCPCS: Performed by: ANESTHESIOLOGY

## 2021-06-22 RX ORDER — KETOROLAC TROMETHAMINE 30 MG/ML
INJECTION, SOLUTION INTRAMUSCULAR; INTRAVENOUS PRN
Status: DISCONTINUED | OUTPATIENT
Start: 2021-06-22 | End: 2021-06-22

## 2021-06-22 RX ORDER — ONDANSETRON 2 MG/ML
INJECTION INTRAMUSCULAR; INTRAVENOUS PRN
Status: DISCONTINUED | OUTPATIENT
Start: 2021-06-22 | End: 2021-06-22 | Stop reason: SDUPTHER

## 2021-06-22 RX ORDER — LABETALOL HYDROCHLORIDE 5 MG/ML
5 INJECTION, SOLUTION INTRAVENOUS EVERY 10 MIN PRN
Status: DISCONTINUED | OUTPATIENT
Start: 2021-06-22 | End: 2021-06-23 | Stop reason: HOSPADM

## 2021-06-22 RX ORDER — LIDOCAINE HYDROCHLORIDE 20 MG/ML
INJECTION, SOLUTION INFILTRATION; PERINEURAL PRN
Status: DISCONTINUED | OUTPATIENT
Start: 2021-06-22 | End: 2021-06-22 | Stop reason: SDUPTHER

## 2021-06-22 RX ORDER — ONDANSETRON 4 MG/1
4 TABLET, FILM COATED ORAL EVERY 6 HOURS PRN
Qty: 30 TABLET | Refills: 0 | Status: SHIPPED | OUTPATIENT
Start: 2021-06-22

## 2021-06-22 RX ORDER — ROCURONIUM BROMIDE 10 MG/ML
INJECTION, SOLUTION INTRAVENOUS PRN
Status: DISCONTINUED | OUTPATIENT
Start: 2021-06-22 | End: 2021-06-22 | Stop reason: SDUPTHER

## 2021-06-22 RX ORDER — SUCCINYLCHOLINE CHLORIDE 20 MG/ML
INJECTION INTRAMUSCULAR; INTRAVENOUS PRN
Status: DISCONTINUED | OUTPATIENT
Start: 2021-06-22 | End: 2021-06-22 | Stop reason: SDUPTHER

## 2021-06-22 RX ORDER — MORPHINE SULFATE 2 MG/ML
2 INJECTION, SOLUTION INTRAMUSCULAR; INTRAVENOUS EVERY 5 MIN PRN
Status: DISCONTINUED | OUTPATIENT
Start: 2021-06-22 | End: 2021-06-23 | Stop reason: HOSPADM

## 2021-06-22 RX ORDER — DIPHENHYDRAMINE HYDROCHLORIDE 50 MG/ML
12.5 INJECTION INTRAMUSCULAR; INTRAVENOUS
Status: ACTIVE | OUTPATIENT
Start: 2021-06-22 | End: 2021-06-22

## 2021-06-22 RX ORDER — DEXAMETHASONE SODIUM PHOSPHATE 4 MG/ML
INJECTION, SOLUTION INTRA-ARTICULAR; INTRALESIONAL; INTRAMUSCULAR; INTRAVENOUS; SOFT TISSUE PRN
Status: DISCONTINUED | OUTPATIENT
Start: 2021-06-22 | End: 2021-06-22 | Stop reason: SDUPTHER

## 2021-06-22 RX ORDER — MORPHINE SULFATE 2 MG/ML
1 INJECTION, SOLUTION INTRAMUSCULAR; INTRAVENOUS EVERY 5 MIN PRN
Status: DISCONTINUED | OUTPATIENT
Start: 2021-06-22 | End: 2021-06-23 | Stop reason: HOSPADM

## 2021-06-22 RX ORDER — BUPIVACAINE HYDROCHLORIDE 2.5 MG/ML
INJECTION, SOLUTION EPIDURAL; INFILTRATION; INTRACAUDAL PRN
Status: DISCONTINUED | OUTPATIENT
Start: 2021-06-22 | End: 2021-06-23 | Stop reason: ALTCHOICE

## 2021-06-22 RX ORDER — OXYCODONE HYDROCHLORIDE AND ACETAMINOPHEN 5; 325 MG/1; MG/1
2 TABLET ORAL PRN
Status: COMPLETED | OUTPATIENT
Start: 2021-06-22 | End: 2021-06-22

## 2021-06-22 RX ORDER — HYDRALAZINE HYDROCHLORIDE 20 MG/ML
5 INJECTION INTRAMUSCULAR; INTRAVENOUS EVERY 10 MIN PRN
Status: DISCONTINUED | OUTPATIENT
Start: 2021-06-22 | End: 2021-06-23 | Stop reason: HOSPADM

## 2021-06-22 RX ORDER — SODIUM CHLORIDE, SODIUM LACTATE, POTASSIUM CHLORIDE, CALCIUM CHLORIDE 600; 310; 30; 20 MG/100ML; MG/100ML; MG/100ML; MG/100ML
INJECTION, SOLUTION INTRAVENOUS CONTINUOUS PRN
Status: DISCONTINUED | OUTPATIENT
Start: 2021-06-22 | End: 2021-06-22 | Stop reason: SDUPTHER

## 2021-06-22 RX ORDER — NEOSTIGMINE METHYLSULFATE 5 MG/5 ML
SYRINGE (ML) INTRAVENOUS PRN
Status: DISCONTINUED | OUTPATIENT
Start: 2021-06-22 | End: 2021-06-22 | Stop reason: SDUPTHER

## 2021-06-22 RX ORDER — TRAMADOL HYDROCHLORIDE 50 MG/1
50 TABLET ORAL EVERY 6 HOURS PRN
Qty: 12 TABLET | Refills: 0 | Status: SHIPPED | OUTPATIENT
Start: 2021-06-22 | End: 2021-06-25

## 2021-06-22 RX ORDER — ONDANSETRON 2 MG/ML
4 INJECTION INTRAMUSCULAR; INTRAVENOUS
Status: ACTIVE | OUTPATIENT
Start: 2021-06-22 | End: 2021-06-22

## 2021-06-22 RX ORDER — KETOROLAC TROMETHAMINE 30 MG/ML
INJECTION, SOLUTION INTRAMUSCULAR; INTRAVENOUS PRN
Status: DISCONTINUED | OUTPATIENT
Start: 2021-06-22 | End: 2021-06-22 | Stop reason: SDUPTHER

## 2021-06-22 RX ORDER — MEPERIDINE HYDROCHLORIDE 50 MG/ML
12.5 INJECTION INTRAMUSCULAR; INTRAVENOUS; SUBCUTANEOUS EVERY 5 MIN PRN
Status: DISCONTINUED | OUTPATIENT
Start: 2021-06-22 | End: 2021-06-23 | Stop reason: HOSPADM

## 2021-06-22 RX ORDER — FENTANYL CITRATE 50 UG/ML
INJECTION, SOLUTION INTRAMUSCULAR; INTRAVENOUS PRN
Status: DISCONTINUED | OUTPATIENT
Start: 2021-06-22 | End: 2021-06-22 | Stop reason: SDUPTHER

## 2021-06-22 RX ORDER — SODIUM CHLORIDE, SODIUM LACTATE, POTASSIUM CHLORIDE, CALCIUM CHLORIDE 600; 310; 30; 20 MG/100ML; MG/100ML; MG/100ML; MG/100ML
1000 INJECTION, SOLUTION INTRAVENOUS ONCE
Status: DISCONTINUED | OUTPATIENT
Start: 2021-06-22 | End: 2021-06-23 | Stop reason: HOSPADM

## 2021-06-22 RX ORDER — OXYCODONE HYDROCHLORIDE AND ACETAMINOPHEN 5; 325 MG/1; MG/1
1 TABLET ORAL PRN
Status: COMPLETED | OUTPATIENT
Start: 2021-06-22 | End: 2021-06-22

## 2021-06-22 RX ORDER — GLYCOPYRROLATE 0.2 MG/ML
INJECTION INTRAMUSCULAR; INTRAVENOUS PRN
Status: DISCONTINUED | OUTPATIENT
Start: 2021-06-22 | End: 2021-06-22 | Stop reason: SDUPTHER

## 2021-06-22 RX ORDER — PROPOFOL 10 MG/ML
INJECTION, EMULSION INTRAVENOUS PRN
Status: DISCONTINUED | OUTPATIENT
Start: 2021-06-22 | End: 2021-06-22 | Stop reason: SDUPTHER

## 2021-06-22 RX ORDER — PROMETHAZINE HYDROCHLORIDE 25 MG/ML
6.25 INJECTION, SOLUTION INTRAMUSCULAR; INTRAVENOUS
Status: ACTIVE | OUTPATIENT
Start: 2021-06-22 | End: 2021-06-22

## 2021-06-22 RX ADMIN — SUCCINYLCHOLINE CHLORIDE 120 MG: 20 INJECTION, SOLUTION INTRAMUSCULAR; INTRAVENOUS at 21:45

## 2021-06-22 RX ADMIN — SODIUM CHLORIDE, SODIUM LACTATE, POTASSIUM CHLORIDE, AND CALCIUM CHLORIDE: .6; .31; .03; .02 INJECTION, SOLUTION INTRAVENOUS at 21:42

## 2021-06-22 RX ADMIN — Medication 3 MG: at 22:44

## 2021-06-22 RX ADMIN — FENTANYL CITRATE 50 MCG: 50 INJECTION INTRAMUSCULAR; INTRAVENOUS at 21:49

## 2021-06-22 RX ADMIN — ROCURONIUM BROMIDE 20 MG: 10 SOLUTION INTRAVENOUS at 21:54

## 2021-06-22 RX ADMIN — DEXAMETHASONE SODIUM PHOSPHATE 8 MG: 4 INJECTION, SOLUTION INTRAMUSCULAR; INTRAVENOUS at 22:18

## 2021-06-22 RX ADMIN — GLYCOPYRROLATE 0.5 MG: 0.2 INJECTION, SOLUTION INTRAMUSCULAR; INTRAVENOUS at 22:44

## 2021-06-22 RX ADMIN — OXYCODONE HYDROCHLORIDE AND ACETAMINOPHEN 1 TABLET: 5; 325 TABLET ORAL at 23:22

## 2021-06-22 RX ADMIN — LIDOCAINE HYDROCHLORIDE 50 MG: 20 INJECTION, SOLUTION INFILTRATION; PERINEURAL at 21:45

## 2021-06-22 RX ADMIN — KETOROLAC TROMETHAMINE 30 MG: 30 INJECTION, SOLUTION INTRAMUSCULAR; INTRAVENOUS at 22:41

## 2021-06-22 RX ADMIN — ONDANSETRON 4 MG: 2 INJECTION INTRAMUSCULAR; INTRAVENOUS at 22:18

## 2021-06-22 RX ADMIN — Medication 0.5 MG: at 23:11

## 2021-06-22 RX ADMIN — PROPOFOL 200 MG: 10 INJECTION, EMULSION INTRAVENOUS at 21:45

## 2021-06-22 RX ADMIN — FENTANYL CITRATE 50 MCG: 50 INJECTION INTRAMUSCULAR; INTRAVENOUS at 21:59

## 2021-06-22 RX ADMIN — HYDROMORPHONE HYDROCHLORIDE 0.5 MG: 1 INJECTION, SOLUTION INTRAMUSCULAR; INTRAVENOUS; SUBCUTANEOUS at 23:11

## 2021-06-22 ASSESSMENT — PULMONARY FUNCTION TESTS
PIF_VALUE: 12
PIF_VALUE: 12
PIF_VALUE: 13
PIF_VALUE: 12
PIF_VALUE: 12
PIF_VALUE: 13
PIF_VALUE: 12
PIF_VALUE: 12
PIF_VALUE: 13
PIF_VALUE: 12
PIF_VALUE: 11
PIF_VALUE: 28
PIF_VALUE: 16
PIF_VALUE: 19
PIF_VALUE: 1
PIF_VALUE: 12
PIF_VALUE: 13
PIF_VALUE: 12
PIF_VALUE: 13
PIF_VALUE: 12
PIF_VALUE: 14
PIF_VALUE: 16
PIF_VALUE: 14
PIF_VALUE: 11
PIF_VALUE: 13
PIF_VALUE: 7
PIF_VALUE: 18
PIF_VALUE: 13
PIF_VALUE: 13
PIF_VALUE: 0
PIF_VALUE: 14
PIF_VALUE: 16
PIF_VALUE: 11
PIF_VALUE: 19
PIF_VALUE: 12
PIF_VALUE: 16
PIF_VALUE: 2
PIF_VALUE: 11
PIF_VALUE: 16
PIF_VALUE: 12
PIF_VALUE: 2
PIF_VALUE: 2
PIF_VALUE: 13
PIF_VALUE: 0
PIF_VALUE: 16
PIF_VALUE: 1
PIF_VALUE: 14
PIF_VALUE: 12
PIF_VALUE: 12
PIF_VALUE: 19
PIF_VALUE: 15
PIF_VALUE: 0
PIF_VALUE: 12
PIF_VALUE: 2
PIF_VALUE: 16
PIF_VALUE: 12
PIF_VALUE: 16
PIF_VALUE: 16
PIF_VALUE: 2
PIF_VALUE: 2
PIF_VALUE: 12
PIF_VALUE: 13
PIF_VALUE: 12
PIF_VALUE: 17
PIF_VALUE: 11
PIF_VALUE: 14
PIF_VALUE: 16

## 2021-06-22 ASSESSMENT — PAIN DESCRIPTION - PROGRESSION: CLINICAL_PROGRESSION: NOT CHANGED

## 2021-06-22 ASSESSMENT — ENCOUNTER SYMPTOMS
ABDOMINAL PAIN: 1
COUGH: 0
VOMITING: 0
NAUSEA: 0
WHEEZING: 0
COLOR CHANGE: 0
BACK PAIN: 0
SHORTNESS OF BREATH: 0
DIARRHEA: 0

## 2021-06-22 ASSESSMENT — PAIN DESCRIPTION - DESCRIPTORS: DESCRIPTORS: ACHING;CRAMPING

## 2021-06-22 ASSESSMENT — PAIN SCALES - GENERAL
PAINLEVEL_OUTOF10: 7
PAINLEVEL_OUTOF10: 8
PAINLEVEL_OUTOF10: 1

## 2021-06-22 ASSESSMENT — PAIN DESCRIPTION - LOCATION: LOCATION: ABDOMEN

## 2021-06-22 ASSESSMENT — PAIN DESCRIPTION - PAIN TYPE: TYPE: SURGICAL PAIN

## 2021-06-23 NOTE — H&P
Department of Obstetrics and Gynecology  Attending Obstetrics History and Physical           CHIEF COMPLAINT:  Left ectopic pregnancy, failed medical mgmt     HISTORY OF PRESENT ILLNESS:       25yo G2 P 0-0-1-0 at 8w 4d by LMP [facility] 22 returns to ED given recent MTX administration on 6/15 after pregnancy of unknown etiology and US findings significant for no IUP, left adnexal mass measuring 1.8cm, with doppler flow, no cardiac activity, no free fluid. Pt has been followed with serial bhcg levels since initial amenorrhea visit in office at [address] and including most recent Day 1, 4, 7 after MTX administration. Pt was noted to have only 10% decline between day 4 & 7. She was called once labs were resulted on day 8 to notify and was stronly encouraged to return to the ED for treatment. Surgical option vs 2nd dose of MTX was discussed. She is today reporting light vaginal bleeding with 50% staining of pad and changing for comfort after 2h and mild L sided pelvic pain 1/10,     Serial bhc21:  2447  21:  4702  21:  4317  6/15/21:  3572  21:  5506  21:  4960       REVIEW OF SYSTEMS    See HPI for further details. Review of systems otherwise negative.         PAST MEDICAL HISTORY    Past Medical History        Past Medical History:   Diagnosis Date    Seasonal allergies              FAMILY HISTORY    Family History   History reviewed.  No pertinent family history.        SOCIAL HISTORY    Social History   Social History            Socioeconomic History    Marital status: Single       Spouse name: None    Number of children: None    Years of education: None    Highest education level: None   Occupational History    None   Tobacco Use    Smoking status: Never Smoker    Smokeless tobacco: Never Used   Substance and Sexual Activity    Alcohol use: Not Currently       Comment: occ    Drug use: No    Sexual activity: None   Other Topics Concern    None   Social History Narrative    None      Social Determinants of Health          Financial Resource Strain:     Difficulty of Paying Living Expenses:    Food Insecurity:     Worried About Running Out of Food in the Last Year:     Ran Out of Food in the Last Year:    Transportation Needs:     Lack of Transportation (Medical):  Lack of Transportation (Non-Medical):    Physical Activity:     Days of Exercise per Week:     Minutes of Exercise per Session:    Stress:     Feeling of Stress :    Social Connections:     Frequency of Communication with Friends and Family:     Frequency of Social Gatherings with Friends and Family:     Attends Episcopalian Services:     Active Member of Clubs or Organizations:     Attends Club or Organization Meetings:     Marital Status:    Intimate Partner Violence:     Fear of Current or Ex-Partner:     Emotionally Abused:     Physically Abused:     Sexually Abused:             SURGICAL HISTORY    Past Surgical History         Past Surgical History:   Procedure Laterality Date    WISDOM TOOTH EXTRACTION                CURRENT MEDICATIONS    Current Outpatient Rx          Current Outpatient Rx   Medication Sig Dispense Refill    omeprazole (PRILOSEC) 20 MG delayed release capsule Take 1 capsule by mouth every morning (before breakfast) 90 capsule 0            ALLERGIES    No Known Allergies     IMMUNIZATIONS       There is no immunization history on file for this patient.     PHYSICAL EXAM    VITAL SIGNS: .Blood pressure (!) 125/100, pulse 89, temperature 98.5 °F (36.9 °C), temperature source Oral, resp. rate 20, height 5' 1\" (1.549 m), weight 105 lb (47.6 kg), last menstrual period 04/18/2021, SpO2 100 %, not currently breastfeeding. Constitutional: Well developed, Well nourished, No acute distress, Non-toxic appearance, appears stated age  Cardiovascular: + S1,S2  Thorax & Lungs: Normal work of breathing, No respiratory distress, No wheezing.    ABDOMEN:  Soft, non tender to palpation, no rebound tenderness, no rebound, no palpable masses  Cervix: deferred to OR  Skin: Warm, Dry. Neurologic: Alert & oriented, Normal motor function, Normal sensory function, No focal deficits noted.      Labs: pending     ASSESSMENT AND PLAN:  The patient is a 23 y.o.  2 parity 0010 at 8.4 weeks     Principal Problem:  -suspected ectopic pregnancy     Plan:   1. Pt clinically stable. Rh positive. 2. S/p failed Medical (Methotrexate) mgmt. Pt initially agreed with repeat dose of MTX, and initially adamant that she was unable to come today for treatment, I called patient back to urge her to come in to reduce risk of poor outcome not limited to rupture of ectopic pregnancy, intraabdominal bleeding, death. Upon furtuer counseling and discussing possiblity of treatment failure with 2nd dose also failing and subsquently needing surgery, pt decided and agreed upon surgical mgmt. 3 Anesthesia and OR team was notified. 4. Labs ordered. R/B/A reviewed, informed consent signed. Proceed to              OR for Laparoscopic Left salpingectomy, possible oophorectomy, possible          laparotomy.

## 2021-06-23 NOTE — ED PROVIDER NOTES
**ADVANCED PRACTICE PROVIDER, I HAVE EVALUATED THIS [address]  ED  EMERGENCY DEPARTMENT ENCOUNTER      Pt Name: April VIPIN Darby  CARLOTTA:[id number]  [place] 1995  Date of evaluation: 6/22/2021  Provider: Jackqueline Hammans, APRN - CNP      Chief Complaint:    Chief Complaint   Patient presents with    Vaginal Bleeding     sent in by  for an ectopic pregnancy, OR is aware,          Nursing Notes, Past Medical Hx, Past Surgical Hx, Social Hx, Allergies, and Family Hx were all reviewed and agreed with or any disagreements were addressed in the HPI.    HPI: (Location, Duration, Timing, Severity, Quality, Assoc Sx, Context, Modifying factors)    This is a  23 y.o. female who presents with left lower quadrant abdominal pain radiating to her umbilical region and vaginal bleeding, patient is currently deal with ectopic pregnancy, she was called in by Dr. Jorge A Peralta in regards to her symptoms, she has been dealing with the discomfort since June 11, 2021 when she was seen here in the ER, she states that she is changing her pad every 2 hours or so initially had a lot of heavy bleeding, bleeding has smelled down however she is still passing some slight clots. She denies saturating a pad. She denies any lightheadedness or dizziness. No nausea vomiting or diarrhea. She was given methotrexate however, has not passed any products of conception, she was told to come to the ER with the intention to go to the operating room due to ectopic pregnancy. Patient denies any cough, congestion, fever chills, no additional complaints, no additional aggravating relieving factors.   Patient presents awake, alert and in no acute distress or toxic appearance    PastMedical/Surgical History:      Diagnosis Date    Seasonal allergies          Procedure Laterality Date    WISDOM TOOTH EXTRACTION         Medications:  Previous Medications    OMEPRAZOLE (PRILOSEC) 20 MG DELAYED RELEASE CAPSULE    Take 1 nondistended with tenderness to the left lower quadrant of her abdomen and goes into the umbilical region. She has no ascites or rigidity. Musculoskeletal:         General: Normal range of motion. Cervical back: Normal range of motion and neck supple. Skin:     General: Skin is warm. Capillary Refill: Capillary refill takes less than 2 seconds. Coloration: Skin is not pale. Neurological:      General: No focal deficit present. Mental Status: She is alert and oriented to person, place, and time. GCS: GCS eye subscore is 4. GCS verbal subscore is 5. GCS motor subscore is 6.    Psychiatric:         Behavior: Behavior normal.         MEDICAL DECISION MAKING    Vitals:    Vitals:    06/22/21 2051 06/22/21 2053   BP: (!) 125/100    Pulse: 89    Resp: 20    Temp: 98.5 °F (36.9 °C)    TempSrc: Oral    SpO2: 100%    Weight: 105 lb (47.6 kg)    Height:  5' 1\" (1.549 m)       LABS:  Labs Reviewed   CBC WITH AUTO DIFFERENTIAL - Abnormal; Notable for the following components:       Result Value    RDW 12.3 (*)     All other components within normal limits    Narrative:     Performed at:  [address],  [place], [address]   Phone [phone]   COMPREHENSIVE METABOLIC PANEL W/ REFLEX TO MG FOR LOW K - Abnormal; Notable for the following components:    Albumin 5.1 (*)     AST 13 (*)     All other components within normal limits    Narrative:     Performed at:  [address],  [place], [address]   Phone [phone]   HCG, QUANTITATIVE, PREGNANCY    Narrative:     Performed at:  [address],  [place], [address]   Phone [phone]   PROTIME-INR    Narrative:     Performed at:  [address],  [place], [address]   Phone [phone]   TYPE AND SCREEN        Remainder of labs reviewed and were negative at this time or not returned at the time of this note. RADIOLOGY:   Non-plain film images such as CT, Ultrasound and MRI are read by the radiologist. Anjali MORRISON APRN - CNP have directly visualized the radiologic plain film image(s) with the below findings:      Interpretation per the Radiologist below, if available at the time of this note:    No orders to display        [address] / ED COURSE:    Because of high probability of sudden clinical deterioration of the patient's condition and risk of further deterioration, critical care time required my full attention to the patient's condition; which included chart data review, documentation, medication ordering, reviewing the patient's old records, reevaluation patient's cardiac, pulmonary and neurological status. Reevaluation of vital signs. Consultations with ED attending and admitting physician. Ordering, interpreting reviewing diagnostic testing. Therefore a critical care time was 15 minutes of direct attention to the patient's condition did not include time spent on procedures.     PROCEDURES:   Procedures    None    Patient was given:  Medications   lactated ringers infusion 1,000 mL (has no administration in time range)   meperidine (DEMEROL) injection 12.5 mg (has no administration in time range)   HYDROmorphone (DILAUDID) injection 0.25 mg (has no administration in time range)   HYDROmorphone (DILAUDID) injection 0.5 mg (has no administration in time range)   morphine (PF) injection 1 mg (has no administration in time range)   morphine (PF) injection 2 mg (has no administration in time range)   oxyCODONE-acetaminophen (PERCOCET) 5-325 MG per tablet 1 tablet (has no administration in time range)     Or   oxyCODONE-acetaminophen (PERCOCET) 5-325 MG per tablet 2 tablet (has no administration in time range)   ondansetron (ZOFRAN) injection 4 mg (has no administration in time range)   promethazine (PHENERGAN) injection 6.25 mg (has no administration in time range)   diphenhydrAMINE (BENADRYL) injection 12.5 mg (has no administration in time range)   labetalol (NORMODYNE;TRANDATE) injection 5 mg (has no administration in time range)   hydrALAZINE (APRESOLINE) injection 5 mg (has no administration in time range)       Patient complains left lower quadrant abdominal pain radiating to her umbilical region and vaginal bleeding, patient is currently deal with ectopic pregnancy, she was called in by Dr. Kvng Pyle in regards to her symptoms, she has been dealing with the discomfort since June 11, 2021 when she was seen here in the ER, she states that she is changing her pad every 2 hours or so initially had a lot of heavy bleeding, bleeding has smelled down however she is still passing some slight clots. She denies saturating a pad. After evaluation and examination of the patient, Dr. Kvng Pyle her OB/GYN sent her to the ED due to ectopic. I initially offered her pain medicine upon ED arrival however she declined stating she is pretty comfortable right now. I did order fluids, blood work and her OB/GYN is on her way in. CBC shows no sepsis or anemia. Metabolic panel shows no electrolyte disturbances or renal insufficiency. Liver functions are normal.  Beta hCG is 5070. Patient has received methotrexate, has had ultrasounds, it is concerning that she has a left ectopic pregnancy. At 2105 I spoke with Dr. Kvng Pyle face to face in the ED, preparing patient to go to OR for ectopic. Therefore, shared medical decision was made between the attending physician, OB/GYN on-call, myself, the patient and patient will be admitted for ectopic pregnancy with surgical intervention. The patient tolerated their visit well. I evaluated the patient. The physician was available for consultation as needed.   The patient and / or the family were informed of the results of any tests, a time was given to answer questions, a plan was proposed and they agreed with plan. CLINICAL IMPRESSION:  1. Left tubal pregnancy without intrauterine pregnancy    2. Vaginal bleeding in pregnancy    3. Abdominal pain, left lower quadrant        DISPOSITION        PATIENT REFERRED TO:  No follow-up provider specified.     DISCHARGE MEDICATIONS:  New Prescriptions    No medications on file       DISCONTINUED MEDICATIONS:  Discontinued Medications    No medications on file              (Please note the MDM and HPI sections of this note were completed with a voice recognition program.  Efforts were made to edit the dictations but occasionally words are mis-transcribed.)    Electronically signed, LOY Ribera CNP,           LOY Ribera CNP  06/22/21 3059

## 2021-06-23 NOTE — OP NOTE
Operative Note      Patient: April D Fredo General  YOB: 1995  MRN: [id number]    Date of Procedure: 6/22/2021    Pre-Op Diagnosis: SUSPICION FOR LEFT ADNEXAL ECTOPIC PREGNANCY    Post-Op Diagnosis: LEFT TUBAL ECTOPIC PREGNANCY       Procedure(s):  LAPAROSCOPIC LEFT SALPINGECTOMY, DILATATION AND CURRETTAGE    Surgeon(s):  Fuentes Lyons DO    Assistant:   Surgical Assistant: Frances Carrera    Anesthesia: General    Estimated Blood Loss (mL): Minimal    Complications: None    Specimens:   ID Type Source Tests Collected by Time Destination   A : LEFT FALLOPIAN TUBE Tissue Tissue SURGICAL [facility], DO 6/22/2021 2220    B : ENDOMETRIAL CURRETTINGS Tissue Tissue SURGICAL PATHOLOGY [facility], DO 6/22/2021 2220        Implants:  * No implants in log *      Drains:   NG/OG/NJ/NE Tube Orogastric 18 fr Center mouth (Active)       [REMOVED] Urethral Catheter 16 fr (Removed)       Findings: Findings:   Normal uterus, ovaries bilaterally and right fallopian tube. Left tube dilated with high suspicion for containing ectopic pregnancy. The pelvic reproductive organs otherwise appeared grossly within normal limits upon general survey. Procedure details:     After obtaining informed consent, the patient was taken to the operating room where general endotracheal anesthesia was administered. She was examined under anesthesia. An 7 cm anteverted uterus was noted. The patient was placed in the dorsal-lithotomy position and prepped and draped in the usual sterile fashion, a weighted speculum was placed in the posterior vagina and a tenaculum was placed at 12 o'clock. An acorn uterine manipulator was placed without difficulty after sounding the uterus to 7 cm and serial dilation with hegar dilators. A sharp curette was advanced into the uterine cavity and was used to scrape the four walls of the uterus until a gritty texture was noted.   Endometrial curettings were collected and sent to pathology for analysis. All instruments were removed. Hemostasis was visualized. Attention was then turned to the patient's abdomen where a 5-mm incision was made in the inferior umbilicus. The abdominal wall was tented and access into the intraperitoneal space was done under direct visualization. Entry was obtained without difficulty. .      The above-noted findings were visualized. Additional Right sided 12-mm and Left sided 5-mm sided accessory ports were placed under direct visualization. Multiple laparoscopic pictures were taken. The abdomen and pelvis were surveyed with the above-noted findings. The left sided tubal ectopic pregnancy was noted and Ligasure device was used to remove via left salpingectomy. Pedicle was visualized and noted to be dry with excellent hemostasis. The instruments were removed from the abdomen under direct visualization with good hemostasis noted. The fascia of the 12-mm port side was closed with 0-vicryl in normal surgical fashion. The acorn manipulator and tenaculum were both removed from the vagina. The L side of the tenaculum of the anterior lip was noted to bleed, silver nitrate sticks and pressure was applied. Adequate hemostasis noted on reinspection. The patient tolerated the procedure well and was taken to the recovery room in stable condition. The port sites were closed with 4-0 vicryl and dermabond over incision sites. The attending, Dr. Madelyn Downey, was present and scrubbed for the entire procedure. The patient was stable at the completion of the procedure. Sponge, lap, and instrument counts were correct.        Electronically signed by Jaquelin Garrido DO on 6/22/2021 at 11:03 PM

## 2021-06-23 NOTE — ANESTHESIA PRE PROCEDURE
Department of Anesthesiology  Preprocedure Note       Name:  Xochilt Harris   Age:  23 y.o.  :  1995                                          MRN:  [id number]         Date:  2021      Surgeon: Rina Muir):  Iggy Hermosillo DO    Procedure: Procedure(s):  LAPAROSCOPIC LEFT SALPINGECTOMY, POSSIBLE OOPHORECTOMY    Medications prior to admission:   Prior to Admission medications    Medication Sig Start Date End Date Taking? Authorizing Provider   omeprazole (PRILOSEC) 20 MG delayed release capsule Take 1 capsule by mouth every morning (before breakfast) 19   Shruthi Pina MD       Current medications:    Current Facility-Administered Medications   Medication Dose Route Frequency Provider Last Rate Last Admin    lactated ringers infusion 1,000 mL  1,000 mL Intravenous Once LOY Saez - CNP         Current Outpatient Medications   Medication Sig Dispense Refill    omeprazole (PRILOSEC) 20 MG delayed release capsule Take 1 capsule by mouth every morning (before breakfast) 90 capsule 0       Allergies:  No Known Allergies    Problem List:  There is no problem list on file for this patient.       Past Medical History:        Diagnosis Date    Seasonal allergies        Past Surgical History:        Procedure Laterality Date    WISDOM TOOTH EXTRACTION         Social History:    Social History     Tobacco Use    Smoking status: Never Smoker    Smokeless tobacco: Never Used   Substance Use Topics    Alcohol use: Not Currently     Comment: occ                                Counseling given: Not Answered      Vital Signs (Current):   Vitals:    21   BP: (!) 125/100    Pulse: 89    Resp: 20    Temp: 98.5 °F (36.9 °C)    TempSrc: Oral    SpO2: 100%    Weight: 105 lb (47.6 kg)    Height:  5' 1\" (1.549 m)                                              BP Readings from Last 3 Encounters:   21 (!) 125/100   06/15/21 119/80   21 132/88       NPO Status: BMI:   Wt Readings from Last 3 Encounters:   06/22/21 105 lb (47.6 kg)   06/15/21 106 lb (48.1 kg)   06/11/21 105 lb (47.6 kg)     Body mass index is 19.84 kg/m². CBC:   Lab Results   Component Value Date    WBC 5.7 06/15/2021    RBC 4.58 06/15/2021    HGB 14.1 06/15/2021    HCT 41.6 06/15/2021    MCV 90.8 06/15/2021    RDW 12.8 06/15/2021     06/15/2021       CMP:   Lab Results   Component Value Date     06/15/2021    K 4.2 06/15/2021    K 3.4 06/11/2021     06/15/2021    CO2 25 06/15/2021    BUN 13 06/15/2021    CREATININE 0.7 06/15/2021    GFRAA >60 06/15/2021    AGRATIO 1.8 06/15/2021    LABGLOM >60 06/15/2021    GLUCOSE 94 06/15/2021    PROT 7.9 06/15/2021    CALCIUM 9.3 06/15/2021    BILITOT 0.3 06/15/2021    ALKPHOS 57 06/15/2021    AST 18 06/15/2021    ALT 15 06/15/2021       POC Tests: No results for input(s): POCGLU, POCNA, POCK, POCCL, POCBUN, POCHEMO, POCHCT in the last 72 hours.     Coags: No results found for: PROTIME, INR, APTT    HCG (If Applicable):   Lab Results   Component Value Date    PREGTESTUR Negative 05/07/2017        ABGs: No results found for: PHART, PO2ART, VWS6JUT, OUJ6JLK, BEART, Q9TNUXRB     Type & Screen (If Applicable):  No results found for: LABABO, LABRH    Drug/Infectious Status (If Applicable):  No results found for: HIV, HEPCAB    COVID-19 Screening (If Applicable): No results found for: COVID19        Anesthesia Evaluation   no history of anesthetic complications:   Airway: Mallampati: II  TM distance: >3 FB   Neck ROM: full  Mouth opening: > = 3 FB Dental: normal exam         Pulmonary:Negative Pulmonary ROS                              Cardiovascular:Negative CV ROS                      Neuro/Psych:   Negative Neuro/Psych ROS              GI/Hepatic/Renal: Neg GI/Hepatic/Renal ROS            Endo/Other:                      ROS comment: ectopic Abdominal:           Vascular: negative

## 2021-06-25 NOTE — ED PROVIDER NOTES
Attending Supervisory Note/Shared Visit   I have personally performed a face to face diagnostic evaluation on this patient. I have reviewed the mid-levels findings and agree. History and Exam by me shows a well-appearing female no acute distress. Patient had previously been seen in the emergency department on June 11 for vaginal bleeding in the 1st trimester. She had an ultrasound which did not show an IUP. She follow-up with her OB due to persistent bleeding. There is concern for ectopic pregnancy and she was told to come to the ED for evaluation admission. Patient did take methotrexate. Evaluation the patient has minimal discomfort. She denies lightheadedness dizziness chest pains or shortness of breath. She states that she thinks this is her 2nd pregnancy. On exam vital signs within normal limits. Patient is regular rate and rhythm. Lungs clear auscultation. Abdomen soft nontender distended. Patient does not appear pallorous. OB consulted at time of patient arrival and was paired with the OR. She was transferred out of the ED in stable condition. I agree with the AMA plan of care. Please AMA Note for full ED course and final disposition. Disposition:  1. Left tubal pregnancy without intrauterine pregnancy    2. Vaginal bleeding in pregnancy    3. Abdominal pain, left lower quadrant    4.  Status post laparoscopy          Brittany Park MD  Attending Emergency Physician        Brittany Park MD  06/24/21 0941

## 2024-07-03 ENCOUNTER — HOSPITAL ENCOUNTER (EMERGENCY)
Age: 29
Discharge: HOME | End: 2024-07-03
Payer: COMMERCIAL

## 2024-07-03 VITALS
SYSTOLIC BLOOD PRESSURE: 112 MMHG | OXYGEN SATURATION: 98 % | HEIGHT: 61 IN | RESPIRATION RATE: 18 BRPM | HEART RATE: 68 BPM | TEMPERATURE: 98.7 F | DIASTOLIC BLOOD PRESSURE: 75 MMHG | WEIGHT: 110 LBS | BODY MASS INDEX: 20.77 KG/M2

## 2024-07-03 DIAGNOSIS — R10.32 LEFT LOWER QUADRANT ABDOMINAL PAIN: Primary | ICD-10-CM

## 2024-07-03 LAB
ALBUMIN SERPL-MCNC: 4.8 G/DL (ref 3.4–5)
ALBUMIN/GLOB SERPL: 2.1 {RATIO} (ref 1.1–2.2)
ALP SERPL-CCNC: 60 U/L (ref 40–129)
ALT SERPL-CCNC: 8 U/L (ref 10–40)
ANION GAP SERPL CALCULATED.3IONS-SCNC: 10 MMOL/L (ref 3–16)
AST SERPL-CCNC: 12 U/L (ref 15–37)
BACTERIA GENITAL QL WET PREP: NORMAL
BASOPHILS # BLD: 0.1 K/UL (ref 0–0.2)
BASOPHILS NFR BLD: 0.7 %
BILIRUB SERPL-MCNC: 0.4 MG/DL (ref 0–1)
BILIRUB UR QL STRIP.AUTO: NEGATIVE
BUN SERPL-MCNC: 12 MG/DL (ref 7–20)
CALCIUM SERPL-MCNC: 8.9 MG/DL (ref 8.3–10.6)
CHLORIDE SERPL-SCNC: 105 MMOL/L (ref 99–110)
CLARITY UR: CLEAR
CLUE CELLS SPEC QL WET PREP: NORMAL
CO2 SERPL-SCNC: 23 MMOL/L (ref 21–32)
COLOR UR: YELLOW
CREAT SERPL-MCNC: <0.5 MG/DL (ref 0.6–1.1)
DEPRECATED RDW RBC AUTO: 13 % (ref 12.4–15.4)
EOSINOPHIL # BLD: 0 K/UL (ref 0–0.6)
EOSINOPHIL NFR BLD: 0.6 %
EPI CELLS SPEC QL WET PREP: NORMAL
GFR SERPLBLD CREATININE-BSD FMLA CKD-EPI: >90 ML/MIN/{1.73_M2}
GLUCOSE SERPL-MCNC: 90 MG/DL (ref 70–99)
GLUCOSE UR STRIP.AUTO-MCNC: NEGATIVE MG/DL
HCG SERPL QL: NEGATIVE
HCT VFR BLD AUTO: 41 % (ref 36–48)
HGB BLD-MCNC: 13.8 G/DL (ref 12–16)
HGB UR QL STRIP.AUTO: NEGATIVE
KETONES UR STRIP.AUTO-MCNC: NEGATIVE MG/DL
LEUKOCYTE ESTERASE UR QL STRIP.AUTO: NEGATIVE
LYMPHOCYTES # BLD: 1.6 K/UL (ref 1–5.1)
LYMPHOCYTES NFR BLD: 19.5 %
MCH RBC QN AUTO: 30.5 PG (ref 26–34)
MCHC RBC AUTO-ENTMCNC: 33.7 G/DL (ref 31–36)
MCV RBC AUTO: 90.4 FL (ref 80–100)
MONOCYTES # BLD: 0.4 K/UL (ref 0–1.3)
MONOCYTES NFR BLD: 5 %
NEUTROPHILS # BLD: 6.1 K/UL (ref 1.7–7.7)
NEUTROPHILS NFR BLD: 74.2 %
NITRITE UR QL STRIP.AUTO: NEGATIVE
PH UR STRIP.AUTO: 7.5 [PH] (ref 5–8)
PLATELET # BLD AUTO: 213 K/UL (ref 135–450)
PMV BLD AUTO: 7.8 FL (ref 5–10.5)
POTASSIUM SERPL-SCNC: 4 MMOL/L (ref 3.5–5.1)
PROT SERPL-MCNC: 7.1 G/DL (ref 6.4–8.2)
PROT UR STRIP.AUTO-MCNC: NEGATIVE MG/DL
RBC # BLD AUTO: 4.53 M/UL (ref 4–5.2)
RBC SPEC QL WET PREP: NORMAL
SODIUM SERPL-SCNC: 138 MMOL/L (ref 136–145)
SP GR UR STRIP.AUTO: 1.01 (ref 1–1.03)
SPECIMEN SOURCE FLD: NORMAL
T VAGINALIS GENITAL QL WET PREP: NORMAL
UA COMPLETE W REFLEX CULTURE PNL UR: NORMAL
UA DIPSTICK W REFLEX MICRO PNL UR: NORMAL
URN SPEC COLLECT METH UR: NORMAL
UROBILINOGEN UR STRIP-ACNC: 0.2 E.U./DL
WBC # BLD AUTO: 8.2 K/UL (ref 4–11)
WBC SPEC QL WET PREP: NORMAL
YEAST GENITAL QL WET PREP: NORMAL

## 2024-07-03 PROCEDURE — 87591 N.GONORRHOEAE DNA AMP PROB: CPT

## 2024-07-03 PROCEDURE — 96375 TX/PRO/DX INJ NEW DRUG ADDON: CPT

## 2024-07-03 PROCEDURE — 99284 EMERGENCY DEPT VISIT MOD MDM: CPT

## 2024-07-03 PROCEDURE — 85025 COMPLETE CBC W/AUTO DIFF WBC: CPT

## 2024-07-03 PROCEDURE — 84703 CHORIONIC GONADOTROPIN ASSAY: CPT

## 2024-07-03 PROCEDURE — 80053 COMPREHEN METABOLIC PANEL: CPT

## 2024-07-03 PROCEDURE — 81003 URINALYSIS AUTO W/O SCOPE: CPT

## 2024-07-03 PROCEDURE — 87491 CHLMYD TRACH DNA AMP PROBE: CPT

## 2024-07-03 PROCEDURE — 6360000002 HC RX W HCPCS: Performed by: PHYSICIAN ASSISTANT

## 2024-07-03 PROCEDURE — 87210 SMEAR WET MOUNT SALINE/INK: CPT

## 2024-07-03 PROCEDURE — 96374 THER/PROPH/DIAG INJ IV PUSH: CPT

## 2024-07-03 RX ORDER — ONDANSETRON 2 MG/ML
4 INJECTION INTRAMUSCULAR; INTRAVENOUS ONCE
Status: COMPLETED | OUTPATIENT
Start: 2024-07-03 | End: 2024-07-03

## 2024-07-03 RX ORDER — KETOROLAC TROMETHAMINE 30 MG/ML
15 INJECTION, SOLUTION INTRAMUSCULAR; INTRAVENOUS ONCE
Status: COMPLETED | OUTPATIENT
Start: 2024-07-03 | End: 2024-07-03

## 2024-07-03 RX ORDER — ONDANSETRON 4 MG/1
4 TABLET, FILM COATED ORAL EVERY 8 HOURS PRN
Qty: 20 TABLET | Refills: 0 | Status: SHIPPED | OUTPATIENT
Start: 2024-07-03

## 2024-07-03 RX ADMIN — ONDANSETRON 4 MG: 2 INJECTION INTRAMUSCULAR; INTRAVENOUS at 16:39

## 2024-07-03 RX ADMIN — KETOROLAC TROMETHAMINE 15 MG: 30 INJECTION INTRAMUSCULAR; INTRAVENOUS at 16:40

## 2024-07-03 ASSESSMENT — PAIN - FUNCTIONAL ASSESSMENT: PAIN_FUNCTIONAL_ASSESSMENT: 0-10

## 2024-07-03 ASSESSMENT — PAIN DESCRIPTION - DESCRIPTORS: DESCRIPTORS: ACHING

## 2024-07-03 ASSESSMENT — PAIN SCALES - GENERAL: PAINLEVEL_OUTOF10: 5

## 2024-07-03 ASSESSMENT — PAIN DESCRIPTION - LOCATION: LOCATION: PELVIS

## 2024-07-03 NOTE — ED PROVIDER NOTES
[facility]  ED  EMERGENCY DEPARTMENT ENCOUNTER        Pt Name: Xochilt Saavedra  MRN: [id number]  Birthdate 1995  Date of evaluation: 7/3/2024  Provider: BLANCA Sharp  PCP: Linda Lozano MD  Note Started: 4:25 PM EDT 7/3/24      AMA. I have evaluated this patient.        CHIEF COMPLAINT       Chief Complaint   Patient presents with    Abdominal Pain     Patient to the ED for lower abdominal pain and pelvic pain that started about 9 am. Reports no vaginal bleeding or discharge.        HISTORY OF PRESENT ILLNESS: 1 or more Elements     History from : Patient    Limitations to history : None    Xochilt Saavedra is a 29 y.o. female who presents to the emergency department complaining of abdominal pain and pelvic pain.  She states pain began around 9 AM.  She states she has previously had an ectopic pregnancy and does not have a fallopian tube on the left side.  Pain is primarily on her left lower quadrant.  She does have some associated nausea no vomiting.  She states throughout the day the pain has gradually improved.  Reports pain is worse when she is up moving around.  She states she does have a history of ovarian cyst.  Denies any dysuria, hematuria, vaginal bleeding or discharge.  No fevers or chills.    Nursing Notes were all reviewed and agreed with or any disagreements were addressed in the HPI.    REVIEW OF SYSTEMS :      Review of Systems    Positives and Pertinent negatives as per HPI.     SURGICAL HISTORY     Past Surgical History:   Procedure Laterality Date    ECTOPIC PREGNANCY SURGERY Left 6/22/2021    LAPAROSCOPIC LEFT SALPINGECTOMY, DILATATION AND CURRETTAGE performed by Sophie Foley DO at [facility] OR    WISDOM TOOTH EXTRACTION         CURRENTMEDICATIONS       Previous Medications    OMEPRAZOLE (PRILOSEC) 20 MG DELAYED RELEASE CAPSULE    Take 1 capsule by mouth every morning (before breakfast)    ONDANSETRON (ZOFRAN) 4 MG TABLET    Take 1 tablet by mouth every 6 hours

## 2024-07-03 NOTE — DISCHARGE INSTRUCTIONS
You were seen in the emergency department today for abdominal pain.  Your workup is overall very reassuring.  At this point I suspect you may have a ruptured ovarian cyst.  This pain typically improves over several days.  You may take ibuprofen or Tylenol for pain and I have prescribed Zofran for nausea as needed.  Please follow-up with your OB/GYN for further evaluation and treatment.  If you develop any new or worsening symptoms return to the emergency department.

## 2024-07-05 LAB
C TRACH DNA CVX QL NAA+PROBE: NEGATIVE
N GONORRHOEA DNA CERV MUCUS QL NAA+PROBE: NEGATIVE

## 2025-07-23 ENCOUNTER — HOSPITAL ENCOUNTER (OUTPATIENT)
Age: 30
Setting detail: OBSERVATION
Discharge: HOME | End: 2025-07-24
Attending: STUDENT IN AN ORGANIZED HEALTH CARE EDUCATION/TRAINING PROGRAM | Admitting: STUDENT IN AN ORGANIZED HEALTH CARE EDUCATION/TRAINING PROGRAM
Payer: COMMERCIAL

## 2025-07-23 ENCOUNTER — APPOINTMENT (OUTPATIENT)
Dept: ULTRASOUND IMAGING | Age: 30
End: 2025-07-23
Payer: COMMERCIAL

## 2025-07-23 DIAGNOSIS — R13.10 DYSPHAGIA, UNSPECIFIED TYPE: ICD-10-CM

## 2025-07-23 DIAGNOSIS — R10.9 INTRACTABLE ABDOMINAL PAIN: Primary | ICD-10-CM

## 2025-07-23 DIAGNOSIS — Z3A.23 23 WEEKS GESTATION OF PREGNANCY: ICD-10-CM

## 2025-07-23 LAB
ALBUMIN SERPL-MCNC: 3.9 G/DL (ref 3.4–5)
ALBUMIN/GLOB SERPL: 2.1 {RATIO} (ref 1.1–2.2)
ALP SERPL-CCNC: 47 U/L (ref 40–129)
ALT SERPL-CCNC: 6 U/L (ref 10–40)
ANION GAP SERPL CALCULATED.3IONS-SCNC: 11 MMOL/L (ref 3–16)
AST SERPL-CCNC: 16 U/L (ref 15–37)
BASOPHILS # BLD: 0 K/UL (ref 0–0.2)
BASOPHILS NFR BLD: 0.5 %
BILIRUB SERPL-MCNC: <0.2 MG/DL (ref 0–1)
BUN SERPL-MCNC: 8 MG/DL (ref 7–20)
CALCIUM SERPL-MCNC: 8.8 MG/DL (ref 8.3–10.6)
CHLORIDE SERPL-SCNC: 104 MMOL/L (ref 99–110)
CO2 SERPL-SCNC: 20 MMOL/L (ref 21–32)
CREAT SERPL-MCNC: 0.7 MG/DL (ref 0.6–1.1)
DEPRECATED RDW RBC AUTO: 13.9 % (ref 12.4–15.4)
EOSINOPHIL # BLD: 0.1 K/UL (ref 0–0.6)
EOSINOPHIL NFR BLD: 1.1 %
GFR SERPLBLD CREATININE-BSD FMLA CKD-EPI: >90 ML/MIN/{1.73_M2}
GLUCOSE SERPL-MCNC: 76 MG/DL (ref 70–99)
HCT VFR BLD AUTO: 34 % (ref 36–48)
HGB BLD-MCNC: 11.7 G/DL (ref 12–16)
LIPASE SERPL-CCNC: 35 U/L (ref 13–60)
LYMPHOCYTES # BLD: 1.6 K/UL (ref 1–5.1)
LYMPHOCYTES NFR BLD: 20.6 %
MCH RBC QN AUTO: 32.4 PG (ref 26–34)
MCHC RBC AUTO-ENTMCNC: 34.3 G/DL (ref 31–36)
MCV RBC AUTO: 94.6 FL (ref 80–100)
MONOCYTES # BLD: 0.5 K/UL (ref 0–1.3)
MONOCYTES NFR BLD: 6.7 %
NEUTROPHILS # BLD: 5.4 K/UL (ref 1.7–7.7)
NEUTROPHILS NFR BLD: 71.1 %
PLATELET # BLD AUTO: 214 K/UL (ref 135–450)
PMV BLD AUTO: 8 FL (ref 5–10.5)
POTASSIUM SERPL-SCNC: 4 MMOL/L (ref 3.5–5.1)
PROT SERPL-MCNC: 5.8 G/DL (ref 6.4–8.2)
RBC # BLD AUTO: 3.6 M/UL (ref 4–5.2)
SODIUM SERPL-SCNC: 135 MMOL/L (ref 136–145)
WBC # BLD AUTO: 7.6 K/UL (ref 4–11)

## 2025-07-23 PROCEDURE — 6370000000 HC RX 637 (ALT 250 FOR IP): Performed by: STUDENT IN AN ORGANIZED HEALTH CARE EDUCATION/TRAINING PROGRAM

## 2025-07-23 PROCEDURE — 96375 TX/PRO/DX INJ NEW DRUG ADDON: CPT

## 2025-07-23 PROCEDURE — 93005 ELECTROCARDIOGRAM TRACING: CPT | Performed by: STUDENT IN AN ORGANIZED HEALTH CARE EDUCATION/TRAINING PROGRAM

## 2025-07-23 PROCEDURE — 76705 ECHO EXAM OF ABDOMEN: CPT

## 2025-07-23 PROCEDURE — 96374 THER/PROPH/DIAG INJ IV PUSH: CPT

## 2025-07-23 PROCEDURE — 80053 COMPREHEN METABOLIC PANEL: CPT

## 2025-07-23 PROCEDURE — 83690 ASSAY OF LIPASE: CPT

## 2025-07-23 PROCEDURE — 36415 COLL VENOUS BLD VENIPUNCTURE: CPT

## 2025-07-23 PROCEDURE — 85025 COMPLETE CBC W/AUTO DIFF WBC: CPT

## 2025-07-23 PROCEDURE — G0378 HOSPITAL OBSERVATION PER HR: HCPCS

## 2025-07-23 PROCEDURE — 6360000002 HC RX W HCPCS: Performed by: STUDENT IN AN ORGANIZED HEALTH CARE EDUCATION/TRAINING PROGRAM

## 2025-07-23 PROCEDURE — 99285 EMERGENCY DEPT VISIT HI MDM: CPT

## 2025-07-23 RX ORDER — ONDANSETRON 2 MG/ML
4 INJECTION INTRAMUSCULAR; INTRAVENOUS ONCE
Status: COMPLETED | OUTPATIENT
Start: 2025-07-23 | End: 2025-07-23

## 2025-07-23 RX ORDER — LANOLIN ALCOHOL/MO/W.PET/CERES
50 CREAM (GRAM) TOPICAL DAILY
COMMUNITY

## 2025-07-23 RX ORDER — MAGNESIUM HYDROXIDE/ALUMINUM HYDROXICE/SIMETHICONE 120; 1200; 1200 MG/30ML; MG/30ML; MG/30ML
30 SUSPENSION ORAL ONCE
Status: COMPLETED | OUTPATIENT
Start: 2025-07-23 | End: 2025-07-23

## 2025-07-23 RX ORDER — PANTOPRAZOLE SODIUM 40 MG/10ML
40 INJECTION, POWDER, LYOPHILIZED, FOR SOLUTION INTRAVENOUS ONCE
Status: COMPLETED | OUTPATIENT
Start: 2025-07-23 | End: 2025-07-23

## 2025-07-23 RX ORDER — ONDANSETRON 4 MG/1
4 TABLET, ORALLY DISINTEGRATING ORAL EVERY 8 HOURS PRN
Status: DISCONTINUED | OUTPATIENT
Start: 2025-07-23 | End: 2025-07-24 | Stop reason: HOSPADM

## 2025-07-23 RX ORDER — ONDANSETRON 2 MG/ML
4 INJECTION INTRAMUSCULAR; INTRAVENOUS EVERY 6 HOURS PRN
Status: DISCONTINUED | OUTPATIENT
Start: 2025-07-23 | End: 2025-07-24 | Stop reason: HOSPADM

## 2025-07-23 RX ADMIN — PANTOPRAZOLE SODIUM 40 MG: 40 INJECTION, POWDER, FOR SOLUTION INTRAVENOUS at 17:45

## 2025-07-23 RX ADMIN — ONDANSETRON 4 MG: 2 INJECTION, SOLUTION INTRAMUSCULAR; INTRAVENOUS at 17:44

## 2025-07-23 RX ADMIN — ALUMINUM HYDROXIDE, MAGNESIUM HYDROXIDE, AND SIMETHICONE 30 ML: 200; 200; 20 SUSPENSION ORAL at 17:38

## 2025-07-23 ASSESSMENT — PAIN - FUNCTIONAL ASSESSMENT
PAIN_FUNCTIONAL_ASSESSMENT: 0-10
PAIN_FUNCTIONAL_ASSESSMENT: 0-10

## 2025-07-23 ASSESSMENT — PAIN DESCRIPTION - LOCATION: LOCATION: THROAT;CHEST

## 2025-07-23 ASSESSMENT — PAIN SCALES - GENERAL
PAINLEVEL_OUTOF10: 0
PAINLEVEL_OUTOF10: 3

## 2025-07-23 ASSESSMENT — LIFESTYLE VARIABLES
HOW MANY STANDARD DRINKS CONTAINING ALCOHOL DO YOU HAVE ON A TYPICAL DAY: PATIENT DOES NOT DRINK
HOW OFTEN DO YOU HAVE A DRINK CONTAINING ALCOHOL: NEVER

## 2025-07-23 ASSESSMENT — PAIN DESCRIPTION - DESCRIPTORS: DESCRIPTORS: TIGHTNESS

## 2025-07-23 ASSESSMENT — ENCOUNTER SYMPTOMS: ABDOMINAL PAIN: 1

## 2025-07-23 NOTE — ED PROVIDER NOTES
Emergency Department Provider Note  Location: [facility]  7/23/2025     Patient Identification  April D Keri is a 30 y.o. female    Chief Complaint  Dysphagia (Patient stated when she eats she's having pain in her throat almost like its \"stuck\". Able to keep food and liquid down but is having pain, also complaints of epigastric pain when eating)      Mode of Arrival  private car    HPI  (History provided by patient)  This is a 30 y.o. female without relevant past medical history presented today for dysphagia.  Patient reports that she is having pain every time she eats and feels like her food gets stuck.  She is able to keep it down in addition to liquids.  She reports epigastric abdominal pain when eating.  Pain is severe 10 out of 10.  She reports feeling nauseous but denies any significant vomiting.  Denies any vaginal bleeding or discharge.  Denies any significant lower abdominal pain    ROS  Review of Systems   Gastrointestinal:  Positive for abdominal pain.   All other systems reviewed and are negative.        I have reviewed the following nursing documentation:  Allergies:   Allergies   Allergen Reactions    Celery (Apium Graveolens True. Phyllis) Skin Test     Donna     Lactose     Parsley Leaves     Pork-Derived Products        Past medical history:  has a past medical history of Anxiety, Depression, Postpartum hemorrhage, and Seasonal allergies.    Past surgical history:  has a past surgical history that includes Cedar tooth extraction; Ectopic pregnancy surgery (Left, 06/22/2021); Tonsillectomy; and Dilation and curettage of uterus.    Home medications:   Prior to Admission medications    Medication Sig Start Date End Date Taking? Authorizing Provider   Prenatal MV-Min-Fe Fum-FA-DHA (PRENATAL 1 PO) Take by mouth   Yes Provider, MD Symone   vitamin B-6 (PYRIDOXINE) 50 MG tablet Take 1 tablet by mouth daily   Yes Provider, MD Symone   Doxylamine Succinate, Sleep, (UNISOM PO) Take

## 2025-07-23 NOTE — CONSULTS
Called [facility] @ 1925  PER:  Cindy Rodgers MD  RE:  23 weeks pregnant with globus sensation and dysphagia  [facility] called @ 1933

## 2025-07-23 NOTE — ED NOTES
Called [facility] regarding patient for admission, spoke with the charge nurse, assigned bed will be 371.

## 2025-07-23 NOTE — ED NOTES
Pt out of dept to US.  After pt leaves dept her visitor notifies this RN that her bracelet is in room.  Attempt to call [facility]  and notify, no answer.

## 2025-07-24 VITALS
SYSTOLIC BLOOD PRESSURE: 101 MMHG | BODY MASS INDEX: 21.71 KG/M2 | TEMPERATURE: 98.8 F | HEART RATE: 82 BPM | OXYGEN SATURATION: 98 % | DIASTOLIC BLOOD PRESSURE: 55 MMHG | WEIGHT: 115 LBS | HEIGHT: 61 IN | RESPIRATION RATE: 16 BRPM

## 2025-07-24 PROBLEM — Z3A.23 23 WEEKS GESTATION OF PREGNANCY: Status: ACTIVE | Noted: 2025-07-24

## 2025-07-24 PROBLEM — K20.80 PILL ESOPHAGITIS: Status: ACTIVE | Noted: 2025-07-24

## 2025-07-24 PROBLEM — T50.905A PILL ESOPHAGITIS: Status: ACTIVE | Noted: 2025-07-24

## 2025-07-24 LAB
BACTERIA URNS QL MICRO: ABNORMAL /HPF
BILIRUB UR QL STRIP.AUTO: NEGATIVE
CLARITY UR: CLEAR
COLOR UR: YELLOW
EKG ATRIAL RATE: 77 BPM
EKG DIAGNOSIS: NORMAL
EKG P AXIS: 15 DEGREES
EKG P-R INTERVAL: 124 MS
EKG Q-T INTERVAL: 366 MS
EKG QRS DURATION: 84 MS
EKG QTC CALCULATION (BAZETT): 414 MS
EKG R AXIS: 36 DEGREES
EKG T AXIS: 20 DEGREES
EKG VENTRICULAR RATE: 77 BPM
EPI CELLS #/AREA URNS HPF: ABNORMAL /HPF (ref 0–5)
GLUCOSE UR STRIP.AUTO-MCNC: NEGATIVE MG/DL
HGB UR QL STRIP.AUTO: NEGATIVE
KETONES UR STRIP.AUTO-MCNC: NEGATIVE MG/DL
LEUKOCYTE ESTERASE UR QL STRIP.AUTO: ABNORMAL
NITRITE UR QL STRIP.AUTO: NEGATIVE
PH UR STRIP.AUTO: 7.5 [PH] (ref 5–8)
PROT UR STRIP.AUTO-MCNC: NEGATIVE MG/DL
RBC #/AREA URNS HPF: ABNORMAL /HPF (ref 0–4)
SP GR UR STRIP.AUTO: 1.01 (ref 1–1.03)
UA DIPSTICK W REFLEX MICRO PNL UR: YES
URN SPEC COLLECT METH UR: ABNORMAL
UROBILINOGEN UR STRIP-ACNC: 0.2 E.U./DL
WBC #/AREA URNS HPF: ABNORMAL /HPF (ref 0–5)

## 2025-07-24 PROCEDURE — 93010 ELECTROCARDIOGRAM REPORT: CPT | Performed by: INTERNAL MEDICINE

## 2025-07-24 PROCEDURE — 96361 HYDRATE IV INFUSION ADD-ON: CPT

## 2025-07-24 PROCEDURE — G0378 HOSPITAL OBSERVATION PER HR: HCPCS

## 2025-07-24 PROCEDURE — 2580000003 HC RX 258: Performed by: STUDENT IN AN ORGANIZED HEALTH CARE EDUCATION/TRAINING PROGRAM

## 2025-07-24 PROCEDURE — 6370000000 HC RX 637 (ALT 250 FOR IP): Performed by: OBSTETRICS & GYNECOLOGY

## 2025-07-24 PROCEDURE — 81001 URINALYSIS AUTO W/SCOPE: CPT

## 2025-07-24 RX ORDER — SODIUM CHLORIDE, SODIUM LACTATE, POTASSIUM CHLORIDE, CALCIUM CHLORIDE 600; 310; 30; 20 MG/100ML; MG/100ML; MG/100ML; MG/100ML
INJECTION, SOLUTION INTRAVENOUS CONTINUOUS
Status: DISCONTINUED | OUTPATIENT
Start: 2025-07-24 | End: 2025-07-24 | Stop reason: HOSPADM

## 2025-07-24 RX ORDER — SUCRALFATE ORAL 1 G/10ML
1 SUSPENSION ORAL 4 TIMES DAILY
Qty: 400 ML | Refills: 0 | Status: SHIPPED | OUTPATIENT
Start: 2025-07-24 | End: 2025-08-03

## 2025-07-24 RX ORDER — SUCRALFATE 1 G/1
1 TABLET ORAL
Status: DISCONTINUED | OUTPATIENT
Start: 2025-07-24 | End: 2025-07-24 | Stop reason: HOSPADM

## 2025-07-24 RX ORDER — SUCRALFATE 1 G/1
1 TABLET ORAL
Status: DISCONTINUED | OUTPATIENT
Start: 2025-07-24 | End: 2025-07-24

## 2025-07-24 RX ADMIN — SODIUM CHLORIDE, SODIUM LACTATE, POTASSIUM CHLORIDE, AND CALCIUM CHLORIDE: .6; .31; .03; .02 INJECTION, SOLUTION INTRAVENOUS at 03:40

## 2025-07-24 RX ADMIN — SUCRALFATE 1 G: 1 TABLET ORAL at 13:27

## 2025-07-24 NOTE — DISCHARGE SUMMARY
Refills: 0           CONTINUE these medications which have NOT CHANGED    Details   Prenatal MV-Min-Fe Fum-FA-DHA (PRENATAL 1 PO) Take by mouth      vitamin B-6 (PYRIDOXINE) 50 MG tablet Take 1 tablet by mouth daily      Doxylamine Succinate, Sleep, (UNISOM PO) Take by mouth      FLUoxetine (PROZAC) 20 MG capsule Take 1 capsule by mouth daily      ondansetron (ZOFRAN) 4 MG tablet Take 1 tablet by mouth every 8 hours as needed for Nausea  Qty: 20 tablet, Refills: 0      omeprazole (PRILOSEC) 20 MG delayed release capsule Take 1 capsule by mouth every morning (before breakfast)  Qty: 90 capsule, Refills: 0           Activity: activity as tolerated and f/u with primary OB provider at [facility] within 1 week.  Diet: regular diet  Wound Care: none needed        Signed:  Lucia Fitzpatrick DO  7/24/2025  3:06 PM

## 2025-07-24 NOTE — PROGRESS NOTES
D/c instructions given. Pt verbalized understanding and denied questions. Pt left OB unit @1558 in stable condition, ambulatory and undelivered. Not in active labor. Pt picked up prescriptions at outpatient pharmacy. Pt is to follow up at next scheduled appt.

## 2025-07-24 NOTE — PROGRESS NOTES
Spoke with Dr Fitzpatrick. Pt states ready to go home. Did not have problem taking medication and ate pudding and increased diet to a peanut butter and jelly sandwich without difficulty. Pt may be d/c to home.

## 2025-07-24 NOTE — PROGRESS NOTES
Spoke w/ Dr. Nguyen Villanueva, GI - Endoscopy not recommended at this time - recommended increasing diet as tolerated & starting carafate.   -this was d/w pt's nurse & orders were placed  - see GI consult note

## 2025-07-24 NOTE — ED NOTES
Moved patient to [facility] in wheelchair with this RN, report given to Cris FREY.

## 2025-07-24 NOTE — PROGRESS NOTES
Dr. Guardado notified of BP 88/52. Pt asymptomatic at this time. Order received for continuous LR at a rate of 100mL/hr. See orders and MAR.   Any Storm RN

## 2025-07-24 NOTE — PROGRESS NOTES
Pt NPO starting @ 0000. Pt notified and agreeable to order. Water bottle emptied at this time. Pt instructed to not eat or drink until after procedure.   Any Storm RN

## 2025-07-24 NOTE — CONSULTS
CONSULTATION  April D Keri is a 30 y.o. female asked to see us in consultation by  Linda Lozano MD & Haydee Guardado MD for evaluation of dysphagia.      Ms. Saavedra is a 30-year-old female with past medical history of anxiety and depression who is 23 weeks pregnant and presents to the ER with globus sensation.  She states she took her morning meds 2 days ago and 15 minutes later developed chest pressure and pain. Her morning medications consist of prozac, B6, Unisom and prenatal vitamin.   She continues to feel chest pressure when she tries to eat. She is not vomiting. She is controlling secretions. No shortness of breath.    She has seen Dr. Caballero in the past for epigastric pain and bloating. Last was 2019.   She was recently seen at [facility] September in 2024 for reflux and underwent an endoscopy demonstrating gastric polyps.  Her small bowel biopsy was unremarkable.  Polyps were cystic fundic gland polyps and her gastric biopsy demonstrated mild chronic gastritis with an intestinal metaplasia and without dysplasia, negative H. pylori.    Labs on admission are largely unremarkable.  Mild normocytic anemia is noted.  Abdominal ultrasound demonstrated a slightly prominent renal pelvis on the right but otherwise no acute abnormalities.        Medications Prior to Admission: Prenatal MV-Min-Fe Fum-FA-DHA (PRENATAL 1 PO), Take by mouth  vitamin B-6 (PYRIDOXINE) 50 MG tablet, Take 1 tablet by mouth daily  Doxylamine Succinate, Sleep, (UNISOM PO), Take by mouth  FLUoxetine (PROZAC) 20 MG capsule, Take 1 capsule by mouth daily  ondansetron (ZOFRAN) 4 MG tablet, Take 1 tablet by mouth every 8 hours as needed for Nausea  omeprazole (PRILOSEC) 20 MG delayed release capsule, Take 1 capsule by mouth every morning (before breakfast)    Medication:     lactated ringers 100 mL/hr at 07/24/25 0708

## 2025-07-24 NOTE — H&P
Department of Obstetrics and Gynecology  Resident History and Physical        CHIEF COMPLAINT:  Dysphagia, Epigastric Pain     HISTORY OF PRESENT ILLNESS:      The patient is a 30 y.o. y/o  @ 23w0d weeks.  Patient presents with a chief complaint as above. Patient states that yesterday she attempted to swallow her usual medication and described sensation as \"it got stuck.\" Attempted to drink more fluids which did not resolved symptoms. Fluids do not exacerbate symptoms, only solids. Initially described pain in epigastric region in the E.R. after eating however denies this currently. She has not attempted to eat since onset of symptoms. Denies N/V/D, CP, SOB, CTX, LOF, or VB. There has been no reported complications during pregnancy.         Past Medical History:        Diagnosis Date    Seasonal allergies      Past Surgical History:        Procedure Laterality Date    ECTOPIC PREGNANCY SURGERY Left 2021    LAPAROSCOPIC LEFT SALPINGECTOMY, DILATATION AND CURRETTAGE performed by Sophie Foley DO at [facility] OR    WISDOM TOOTH EXTRACTION       Social History:    TOBACCO:   reports that she has never smoked. She has never used smokeless tobacco.  ETOH:   reports that she does not currently use alcohol.  DRUGS:   reports no history of drug use.  MARITAL STATUS:    Family History:   No family history on file.  Medications Prior to Admission:    Allergies:   No Known Allergies      PHYSICAL EXAM:  Vitals:    25 1641 25 19525   BP: 105/70 113/72 113/72 114/68   Pulse: 70 73  65   Resp: 19 20  16   Temp: 98.3 °F (36.8 °C)   98.2 °F (36.8 °C)   TempSrc: Oral   Oral   SpO2: 99% 100% 100%    Weight: 52.2 kg (115 lb)      Height: 1.549 m (5' 1\")         General appearance:  awake, alert, cooperative, no apparent distress, and appears stated age  Heart: RRR  ABDIFATAH: CTA b/l  ABD: gravid, soft, NT, ND, +BS  EXT: no c/c/e      IMAGING:   EXAM: US ABDOMEN LIMITED     INDICATION:

## 2025-07-24 NOTE — PLAN OF CARE
Problem: Infection - Adult  Goal: Absence of infection at discharge  Outcome: Progressing  Goal: Absence of infection during hospitalization  Outcome: Progressing  Goal: Absence of fever/infection during anticipated neutropenic period  Outcome: Progressing     Problem: Safety - Adult  Goal: Free from fall injury  Outcome: Progressing     Problem: Chronic Conditions and Co-morbidities  Goal: Patient's chronic conditions and co-morbidity symptoms are monitored and maintained or improved  Outcome: Progressing

## 2025-07-24 NOTE — PROGRESS NOTES
Pt to room 371 from ED for overnight observation and a GI consult. Pt receives PNC care at [facility] so DOM Dr. Guardado notified of pt arrival and complaints. Pt placed on monitor and RN remains at bedside adjusting fetal monitor. Baby active. Pt in stable condition and denies any concerns at this time.

## 2025-07-24 NOTE — PROGRESS NOTES
Admission completed and documented in flowsheet. Pt educated on use of room phone. Whiteboard updated and CLWR. Pt denies questions or concerns at this time.  Any Storm RN

## 2025-07-24 NOTE — PROGRESS NOTES
OB/GYN Attending AP Progress Note      S: Pt is a 30 y.o.  Hospital day #2 at 23w1d  currently admitted for epigastric pain and dysphagia. Pt reports that she is doing well. She denies CTX, VB, LOF, or decreased fetal movement. States that her pain currently is well controlled. Has been NPO since midnight. Asymptomatic hypotension overnight improved with 100cc/hr LR.     O:  BP (!) 96/52   Pulse 74   Temp 98.2 °F (36.8 °C) (Oral)   Resp 16   Ht 1.549 m (5' 1\")   Wt 52.2 kg (115 lb)   SpO2 98%   BMI 21.73 kg/m²   General: Aox3  Cardiac: RRR  Pulm: CTAB  Abdomen: soft, gravid, nontender, epigastric pain mild on palpation   Extemities: nontender, no edema      A/P: 30 y.o.  at 23w1d admitted for epigastric pain and dysphagia  -Pt doing well overnight  -NST daily  -Diet NPO  -VSS  -LR 100cc/hr  -Pending GI consult for further recommendations/treatment     Haydee Guadrado MD  OB/GYN

## 2025-07-24 NOTE — PROGRESS NOTES
Report received from RADHA Bhagat RN. Bedside report given. Introduced myself to pt as her RN for the day. I put my name and phone number on the white board and showed pt how to use her room phone to get a hold of me. Pt was given her plan of care for the day. Call light within reach. Bed in lowest position and wheels are locked. Pt verbalized understanding and denies any further needs at this time.Continue to monitor.  Any Storm RN

## (undated) DEVICE — PAD,NON-ADHERENT,3X8,STERILE,LF,1/PK: Brand: MEDLINE

## (undated) DEVICE — GLOVE SURG SZ 65 THK91MIL LTX FREE SYN POLYISOPRENE

## (undated) DEVICE — PACK PROCEDURE SURG GYN LAP CDS

## (undated) DEVICE — SYRINGE MED 10ML POLYPR GEN PURP FLAT TOP LUERLOCK TIP

## (undated) DEVICE — SEALER LAP L37CM MARYLAND JAW OPN NANO COAT MULTIFUNCTIONAL

## (undated) DEVICE — PUMP SUC IRR TBNG L10FT W/ HNDPC ASSEMB STRYKEFLOW 2

## (undated) DEVICE — SUTURE VCRL SZ 0 L36IN ABSRB UD CT-1 L36MM 1/2 CIR TAPR PNT VCP946H

## (undated) DEVICE — TROCAR: Brand: KII FIOS FIRST ENTRY

## (undated) DEVICE — GLOVE SURG SZ 6 THK91MIL LTX FREE SYN POLYISOPRENE ANTI

## (undated) DEVICE — TROCAR: Brand: KII SLEEVE

## (undated) DEVICE — SUTURE VCRL + SZ 4-0 L18IN ABSRB UD L19MM PS-2 3/8 CIR PRIM VCP496H

## (undated) DEVICE — DRAPE,UNDERBUTTOCKS,PCH,STERILE: Brand: MEDLINE

## (undated) DEVICE — INSUFFLATION NEEDLE TO ESTABLISH PNEUMOPERITONEUM.: Brand: INSUFFLATION NEEDLE

## (undated) DEVICE — SUTURE VCRL + SZ 0 L27IN ABSRB VLT L26MM UR-6 5/8 CIR VCP603H

## (undated) DEVICE — Device